# Patient Record
Sex: FEMALE | Race: ASIAN | Employment: FULL TIME | URBAN - METROPOLITAN AREA
[De-identification: names, ages, dates, MRNs, and addresses within clinical notes are randomized per-mention and may not be internally consistent; named-entity substitution may affect disease eponyms.]

---

## 2017-01-09 ENCOUNTER — HOSPITAL ENCOUNTER (OUTPATIENT)
Dept: GENERAL RADIOLOGY | Age: 52
Discharge: HOME | End: 2017-01-09
Attending: FAMILY MEDICINE
Payer: COMMERCIAL

## 2017-01-09 ENCOUNTER — TELEPHONE (OUTPATIENT)
Dept: FAMILY MEDICINE CLINIC | Age: 52
End: 2017-01-09

## 2017-01-09 ENCOUNTER — HOSPITAL ENCOUNTER (OUTPATIENT)
Dept: LAB | Age: 52
Discharge: HOME | End: 2017-01-09
Payer: COMMERCIAL

## 2017-01-09 ENCOUNTER — OFFICE VISIT (OUTPATIENT)
Dept: FAMILY MEDICINE CLINIC | Age: 52
End: 2017-01-09

## 2017-01-09 VITALS
RESPIRATION RATE: 20 BRPM | HEIGHT: 62 IN | OXYGEN SATURATION: 100 % | HEART RATE: 72 BPM | SYSTOLIC BLOOD PRESSURE: 115 MMHG | DIASTOLIC BLOOD PRESSURE: 68 MMHG | WEIGHT: 133.2 LBS | BODY MASS INDEX: 24.51 KG/M2 | TEMPERATURE: 97.8 F

## 2017-01-09 DIAGNOSIS — Z13.820 OSTEOPOROSIS SCREENING: ICD-10-CM

## 2017-01-09 DIAGNOSIS — R05.9 COUGH: ICD-10-CM

## 2017-01-09 DIAGNOSIS — Z12.39 SCREENING FOR MALIGNANT NEOPLASM OF BREAST: ICD-10-CM

## 2017-01-09 DIAGNOSIS — Z01.419 WELL WOMAN EXAM WITH ROUTINE GYNECOLOGICAL EXAM: Primary | ICD-10-CM

## 2017-01-09 DIAGNOSIS — E55.9 VITAMIN D DEFICIENCY: ICD-10-CM

## 2017-01-09 DIAGNOSIS — Z11.59 NEED FOR HEPATITIS C SCREENING TEST: ICD-10-CM

## 2017-01-09 DIAGNOSIS — Z12.4 SCREENING FOR MALIGNANT NEOPLASM OF CERVIX: ICD-10-CM

## 2017-01-09 DIAGNOSIS — Z12.11 SCREEN FOR COLON CANCER: ICD-10-CM

## 2017-01-09 PROCEDURE — 71020 XR CHEST PA LAT: CPT

## 2017-01-09 PROCEDURE — 88175 CYTOPATH C/V AUTO FLUID REDO: CPT | Performed by: FAMILY MEDICINE

## 2017-01-09 RX ORDER — LEVOFLOXACIN 500 MG/1
500 TABLET, FILM COATED ORAL DAILY
Qty: 10 TAB | Refills: 0 | Status: SHIPPED | OUTPATIENT
Start: 2017-01-09 | End: 2017-05-15 | Stop reason: ALTCHOICE

## 2017-01-09 RX ORDER — BISMUTH SUBSALICYLATE 262 MG
1 TABLET,CHEWABLE ORAL DAILY
COMMUNITY

## 2017-01-09 NOTE — PROGRESS NOTES
Subjective:   46 y.o. female for Well Woman Check. She is postmenopausal.  Social History: single partner, contraception - vasectomy. Pertinent past medical hstory: . Patient Active Problem List    Diagnosis Date Noted    Rheumatoid arthritis ([facility] [address] 12/07/2015    Carpal tunnel syndrome 12/07/2015    Gastroparesis 12/28/2011    GERD (gastroesophageal reflux disease) 06/30/2011     Current Outpatient Prescriptions   Medication Sig Dispense Refill    multivitamin (ONE A DAY) tablet Take 1 Tab by mouth daily.  levoFLOXacin (LEVAQUIN) 500 mg tablet Take 1 Tab by mouth daily. 10 Tab 0    folic acid (FOLVITE) 1 mg tablet Take 1 mg by mouth daily. 11    methotrexate (RHEUMATREX) 2.5 mg tablet Take 25 mg by mouth every Sunday. 5    GLUCOSAMINE/CHONDRO SALAZAR A (COSAMIN DS PO) Take  by mouth daily. daily, 0 Refills      polyethylene glycol (MIRALAX) 17 gram/dose powder  g, PO, daily, 0 Refills      cholecalciferol, vitamin D3, (VITAMIN D3) 2,000 unit tab 4,000 Units. takes sometimes, 0 Refills      B.infantis-B.ani-B.long-B.bifi (PROBIOTIC 4X) 10-15 mg TbEC Take  by mouth.  FISH  mg cap Take  by mouth.  calcium 500 mg tab Take  by mouth.        Allergies   Allergen Reactions    Biaxin [Clarithromycin] Shortness of Breath and Swelling    Ethanol (Ethyl Alcohol) Other (comments)     Cannot digest    Tormentil Other (comments)     redness     Past Medical History   Diagnosis Date    Bursitis/tendonitis, shoulder     Gastroparesis     GERD (gastroesophageal reflux disease)     Rheumatoid arthritis (HCC)     Rheumatoid arthritis (HCC)      Past Surgical History   Procedure Laterality Date    Hx other surgical  2008     right knuckle surgery to remove janet thorn     Family History   Problem Relation Age of Onset    Hypertension Mother     Hypertension Sister    Job [place] Elevated Lipids Sister      Social History   Substance Use Topics    Smoking status: Never Smoker    Smokeless tobacco: Never Used    Alcohol use No        ROS:  Not feeling well. chest pain from coughing. No abdominal pain, change in bowel habits, black or bloody stools. No urinary tract symptoms. GYN ROS: no breast pain or new or enlarging lumps on self exam, no vaginal bleeding, no discharge or pelvic pain. Menopausal symptoms: none. No neurological complaints. C/O pain in left side chest.  Cont to cough even after taking Amoxicillin. Productive thick phlegm. Pt no longer has headaches since quit taking caffeine. She did not get CT scan head. Last DEXA scan and T-score: none  Last Colonoscopy: 2013  Last Mammogram: Dec 2016    Objective:     Visit Vitals    LMP 02/10/2015     The patient appears well, alert, oriented x 3, in no distress. ENT normal.  Neck supple. No adenopathy or thyromegaly. SHIRLEY. Chest wall tenderness. Lungs are clear, good air entry, no wheezes, rhonchi or rales. S1 and S2 normal, no murmurs, regular rate and rhythm. Abdomen soft without tenderness, guarding, mass or organomegaly. Extremities show no edema, normal peripheral pulses. Neurological is normal, no focal findings. BREAST EXAM: breasts appear normal, no suspicious masses, no skin or nipple changes or axillary nodes    PELVIC EXAM: normal external genitalia, vulva, vagina, cervix, uterus and adnexa    Assessment/Plan:   well woman  postmenopausal  pap smear  return annually or prn  bone density screening ordered  screening colonoscopy referral written    ICD-10-CM ICD-9-CM    1. Well woman exam with routine gynecological exam Z01.419 V72.31 CBC WITH AUTOMATED DIFF      METABOLIC PANEL, COMPREHENSIVE      LIPID PANEL      TSH 3RD GENERATION      HEMOGLOBIN A1C WITH EAG    [V72.31]   2. Screening for malignant neoplasm of cervix Z12.4 V76.2 PAP, LIQUID BASED, IMAGE PROCESSED   3. Screening for malignant neoplasm of breast Z12.39 V76.10    4. Osteoporosis screening Z13.820 V82.81    5.  Screen for colon cancer Z12.11 V76.51 CANCELED: OCCULT BLOOD, IMMUNOASSAY (FIT)   6. Vitamin D deficiency E55.9 268.9 VITAMIN D, 25 HYDROXY   7. Need for hepatitis C screening test Z11.59 V73.89 HEPATITIS C AB   8. Cough R05 786.2 XR CHEST PA LAT      levoFLOXacin (LEVAQUIN) 500 mg tablet   . Order CXR. Order Levaquin x 10 days. Ibuprofen for muscular CP. Labs drawn. PAP done. Pt had colonoscopy done 2013 per Dr. Glory Quinonez. Request copy records. Patient Instructions        Cough: Care Instructions  Your Care Instructions  A cough is your body's response to something that bothers your throat or airways. Many things can cause a cough. You might cough because of a cold or the flu, bronchitis, or asthma. Smoking, postnasal drip, allergies, and stomach acid that backs up into your throat also can cause coughs. A cough is a symptom, not a disease. Most coughs stop when the cause, such as a cold, goes away. You can take a few steps at home to cough less and feel better. Follow-up care is a key part of your treatment and safety. Be sure to make and go to all appointments, and call your doctor if you are having problems. It's also a good idea to know your test results and keep a list of the medicines you take. How can you care for yourself at home? · Drink lots of water and other fluids. This helps thin the mucus and soothes a dry or sore throat. Honey or lemon juice in hot water or tea may ease a dry cough. · Take cough medicine as directed by your doctor. · Prop up your head on pillows to help you breathe and ease a dry cough. · Try cough drops to soothe a dry or sore throat. Cough drops don't stop a cough. Medicine-flavored cough drops are no better than candy-flavored drops or hard candy. · Do not smoke. Avoid secondhand smoke. If you need help quitting, talk to your doctor about stop-smoking programs and medicines. These can increase your chances of quitting for good. When should you call for help?   Call 911 anytime you think you may need emergency care. For example, call if:  · You have severe trouble breathing. Call your doctor now or seek immediate medical care if:  · You cough up blood. · You have new or worse trouble breathing. · You have a new or higher fever. · You have a new rash. Watch closely for changes in your health, and be sure to contact your doctor if:  · You cough more deeply or more often, especially if you notice more mucus or a change in the color of your mucus. · You have new symptoms, such as a sore throat, an earache, or sinus pain. · You do not get better as expected. Where can you learn more? Go to http://bell-thom.info/. Enter D279 in the search box to learn more about \"Cough: Care Instructions. \"  Current as of: May 27, 2016  Content Version: 11.1  © 9965-7585 [employer]. Care instructions adapted under license by [employer] (which disclaims liability or warranty for this information). If you have questions about a medical condition or this instruction, always ask your healthcare professional. [address] any warranty or liability for your use of this information.

## 2017-01-09 NOTE — PATIENT INSTRUCTIONS
Cough: Care Instructions  Your Care Instructions  A cough is your body's response to something that bothers your throat or airways. Many things can cause a cough. You might cough because of a cold or the flu, bronchitis, or asthma. Smoking, postnasal drip, allergies, and stomach acid that backs up into your throat also can cause coughs. A cough is a symptom, not a disease. Most coughs stop when the cause, such as a cold, goes away. You can take a few steps at home to cough less and feel better. Follow-up care is a key part of your treatment and safety. Be sure to make and go to all appointments, and call your doctor if you are having problems. It's also a good idea to know your test results and keep a list of the medicines you take. How can you care for yourself at home? · Drink lots of water and other fluids. This helps thin the mucus and soothes a dry or sore throat. Honey or lemon juice in hot water or tea may ease a dry cough. · Take cough medicine as directed by your doctor. · Prop up your head on pillows to help you breathe and ease a dry cough. · Try cough drops to soothe a dry or sore throat. Cough drops don't stop a cough. Medicine-flavored cough drops are no better than candy-flavored drops or hard candy. · Do not smoke. Avoid secondhand smoke. If you need help quitting, talk to your doctor about stop-smoking programs and medicines. These can increase your chances of quitting for good. When should you call for help? Call 911 anytime you think you may need emergency care. For example, call if:  · You have severe trouble breathing. Call your doctor now or seek immediate medical care if:  · You cough up blood. · You have new or worse trouble breathing. · You have a new or higher fever. · You have a new rash.   Watch closely for changes in your health, and be sure to contact your doctor if:  · You cough more deeply or more often, especially if you notice more mucus or a change in the color of your mucus. · You have new symptoms, such as a sore throat, an earache, or sinus pain. · You do not get better as expected. Where can you learn more? Go to http://bell-thom.info/. Enter D279 in the search box to learn more about \"Cough: Care Instructions. \"  Current as of: May 27, 2016  Content Version: 11.1  © 8286-6112 [employer]. Care instructions adapted under license by [employer] (which disclaims liability or warranty for this information). If you have questions about a medical condition or this instruction, always ask your healthcare professional. [address] any warranty or liability for your use of this information.

## 2017-01-09 NOTE — LETTER
1/12/2017 1:34 PM 
 
Ms[address] [id number] Dear Humphrey Campbell: 
 
Please find your most recent results below. RECOMMENDATIONS: 
Your PAP test has come back normal.  
 
Please call me if you have any questions: [phone] Sincerely, 
 
 
Dr Pablo Morton

## 2017-01-09 NOTE — LETTER
1/12/2017 1:33 PM 
 
Ms[address] [id number] Dear Naga Patel: 
 
Please find your most recent results below. Resulted Orders CBC WITH AUTOMATED DIFF Result Value Ref Range WBC 6.7 3.4 - 10.8 x10E3/uL  
 RBC 4.34 3.77 - 5.28 x10E6/uL HGB 11.9 11.1 - 15.9 g/dL HCT 37.1 34.0 - 46.6 % MCV 86 79 - 97 fL  
 MCH 27.4 26.6 - 33.0 pg  
 MCHC 32.1 31.5 - 35.7 g/dL  
 RDW 14.5 12.3 - 15.4 % PLATELET 343 173 - 665 x10E3/uL NEUTROPHILS 69 % Lymphocytes 22 % MONOCYTES 7 % EOSINOPHILS 1 % BASOPHILS 1 %  
 ABS. NEUTROPHILS 4.7 1.4 - 7.0 x10E3/uL Abs Lymphocytes 1.4 0.7 - 3.1 x10E3/uL  
 ABS. MONOCYTES 0.4 0.1 - 0.9 x10E3/uL  
 ABS. EOSINOPHILS 0.1 0.0 - 0.4 x10E3/uL  
 ABS. BASOPHILS 0.0 0.0 - 0.2 x10E3/uL IMMATURE GRANULOCYTES 0 %  
 ABS. IMM. GRANS. 0.0 0.0 - 0.1 x10E3/uL Narrative Performed at:  [address]  [id number] : Thiago Wheeler MD, Phone:  [phone] METABOLIC PANEL, COMPREHENSIVE Result Value Ref Range Glucose 87 65 - 99 mg/dL BUN 10 6 - 24 mg/dL Creatinine 0.62 0.57 - 1.00 mg/dL GFR est non- >59 mL/min/1.73 GFR est  >59 mL/min/1.73  
 BUN/Creatinine ratio 16 9 - 23 Sodium 141 134 - 144 mmol/L Potassium 3.9 3.5 - 5.2 mmol/L Chloride 100 96 - 106 mmol/L  
 CO2 27 18 - 29 mmol/L Calcium 9.8 8.7 - 10.2 mg/dL Protein, total 7.8 6.0 - 8.5 g/dL Albumin 5.0 3.5 - 5.5 g/dL GLOBULIN, TOTAL 2.8 1.5 - 4.5 g/dL A-G Ratio 1.8 1.1 - 2.5 Bilirubin, total 0.3 0.0 - 1.2 mg/dL Alk. phosphatase 73 39 - 117 IU/L  
 AST 30 0 - 40 IU/L  
 ALT 27 0 - 32 IU/L Narrative Performed at:  [address]  [id number] : Thiago Wheeler MD, Phone:  [phone] LIPID PANEL Result Value Ref Range Cholesterol, total 219 (H) 100 - 199 mg/dL Triglyceride 63 0 - 149 mg/dL HDL Cholesterol 83 >39 mg/dL VLDL, calculated 13 5 - 40 mg/dL LDL, calculated 123 (H) 0 - 99 mg/dL Narrative Performed at:  [address]  [id number] : Darletta Dancer MD, Phone:  [phone] TSH 3RD GENERATION Result Value Ref Range TSH 1.520 0.450 - 4.500 uIU/mL Narrative Performed at:  [address]  [id number] : Darletta Dancer MD, Phone:  [phone] VITAMIN D, 25 HYDROXY Result Value Ref Range VITAMIN D, 25-HYDROXY 56.7 30.0 - 100.0 ng/mL Comment:  
   Vitamin D deficiency has been defined by the [address] [place] [address] practice guideline as a 
level of serum 25-OH vitamin D less than 20 ng/mL (1,2). The Endocrine Society went on to further define vitamin D 
insufficiency as a level between 21 and 29 ng/mL (2). 1. IOM ([place] of [employer]). 2010. Dietary reference 
   intakes for calcium and D. [address] [place] [address]: The 
   [facility]. 2. Grey MF, Cuong NC, Davon RAYMOND, et al. 
   Evaluation, treatment, and prevention of vitamin D 
   deficiency: an Endocrine Society clinical practice 
   guideline. JCEM. 2011 Jul; 96(7):1911-30. Narrative Performed at:  [address]  [id number] : Darletta Dancer MD, Phone:  [phone] HEMOGLOBIN A1C WITH EAG Result Value Ref Range Hemoglobin A1c 5.8 (H) 4.8 - 5.6 % Comment:  
            Pre-diabetes: 5.7 - 6.4 Diabetes: >6.4 Glycemic control for adults with diabetes: <7.0 Estimated average glucose 120 mg/dL Narrative Performed at:  [address]  [id number] : Darletta Dancer MD, Phone:  [phone] HEPATITIS C AB Result Value Ref Range Hep C Virus Ab <0.1 0.0 - 0.9 s/co ratio Comment:  
                                     Negative:     < 0.8 Indeterminate: 0.8 - 0.9 Positive:     > 0.9 The CDC recommends that a positive HCV antibody result 
 be followed up with a HCV Nucleic Acid Amplification 
 test ([id number]). Narrative Performed at:  [address]  [id number] : Thiago Wheeler MD, Phone:  [phone] CVD REPORT Result Value Ref Range INTERPRETATION Note Comment:  
   Supplement report is available. Narrative Performed at:  [address] [place] [address] [address] [place] [address]  [id number] : Karel Sams PhD, Phone:  [phone] RECOMMENDATIONS: 
Lab results look good.  Mild elevated cholesterol numbers.  Need to watch diet.  Normal kidney and liver tests. Normal thyroid.  Good Vit D level.  You are at risk for diabetes.  Minimize sugar and starches in diet.  
    
   
 
 
 
Please call me if you have any questions: [phone] Sincerely, Matias Cruz MD

## 2017-01-09 NOTE — PROGRESS NOTES
Identified pt with two pt identifiers(name and ). Chief Complaint   Patient presents with    Physical     pap    Cough     dry cough and her chest hurts when she coughs    Headache     no headaches since no caffene        Health Maintenance Due   Topic    COLONOSCOPY        Wt Readings from Last 3 Encounters:   17 133 lb 3.2 oz (60.4 kg)   16 132 lb (59.9 kg)   16 128 lb 3.2 oz (58.2 kg)     Temp Readings from Last 3 Encounters:   17 97.8 °F (36.6 °C) (Oral)   16 98.2 °F (36.8 °C) (Oral)   16 97.8 °F (36.6 °C) (Oral)     BP Readings from Last 3 Encounters:   17 115/68   16 122/70   16 105/67     Pulse Readings from Last 3 Encounters:   17 72   16 82   16 79         Learning Assessment:  :     Learning Assessment 2/3/2014   PRIMARY LEARNER Patient   HIGHEST LEVEL OF EDUCATION - PRIMARY LEARNER  DID NOT GRADUATE HIGH SCHOOL   BARRIERS PRIMARY LEARNER NONE   CO-LEARNER CAREGIVER No   PRIMARY LANGUAGE OTHER (COMMENT)   LEARNER PREFERENCE PRIMARY LISTENING     DEMONSTRATION   ANSWERED BY SELF   RELATIONSHIP SELF       Depression Screening:  :     PHQ 2 / 9, over the last two weeks 2016   Little interest or pleasure in doing things Not at all   Feeling down, depressed or hopeless Not at all   Total Score PHQ 2 0       Fall Risk Assessment:  :     Fall Risk Assessment, last 12 mths 2017   Able to walk? Yes   Fall in past 12 months? No       Abuse Screening:  :     Abuse Screening Questionnaire 2017 3/16/2015   Do you ever feel afraid of your partner? N N   Are you in a relationship with someone who physically or mentally threatens you? N N   Is it safe for you to go home?  Y Y       Coordination of Care Questionnaire:  :     1) Have you been to an emergency room, urgent care clinic since your last visit? no   Hospitalized since your last visit? no             2) Have you seen or consulted any other health care providers outside of [facility] since your last visit? yes Dr Edilma Middleton. (Include any pap smears or colon screenings in this section.)    3) Do you have an Advance Directive on file? no  Are you interested in receiving information about Advance Directives? no    Reviewed record in preparation for visit and have obtained necessary documentation. Medication reconciliation up to date and corrected with patient at this time.

## 2017-01-09 NOTE — MR AVS SNAPSHOT
Visit Information Date & Time Provider Department Dept. Phone Encounter #  
 5/5/5190  3:67 AM Damir Donohue Santhosh [phone] [id number] Upcoming Health Maintenance Date Due COLONOSCOPY 7/25/1983 BREAST CANCER SCRN MAMMOGRAM 12/28/2018 PAP AKA CERVICAL CYTOLOGY 1/9/2020 DTaP/Tdap/Td series (3 - Td) 1/9/2027 Allergies as of 1/9/2017  Review Complete On: 9/4/8500 By: Shen Ball MD  
  
 Severity Noted Reaction Type Reactions Biaxin [Clarithromycin] High 06/29/2011    Shortness of Breath, Swelling Ethanol (Ethyl Alcohol)  06/29/2011    Other (comments) Cannot digest  
 Tormentil  06/29/2011    Other (comments)  
 redness Current Immunizations  Reviewed on 12/7/2015 Name Date Tdap 4/26/2005 Not reviewed this visit You Were Diagnosed With   
  
 Codes Comments Well woman exam with routine gynecological exam    -  Primary ICD-10-CM: K41.992 ICD-9-CM: V72.31 [V72.31] Screening for malignant neoplasm of cervix     ICD-10-CM: Z12.4 ICD-9-CM: V76.2 Screening for malignant neoplasm of breast     ICD-10-CM: Z12.39 
ICD-9-CM: V76.10 Osteoporosis screening     ICD-10-CM: Z13.820 ICD-9-CM: V82.81 Screen for colon cancer     ICD-10-CM: Z12.11 ICD-9-CM: V76.51 Vitamin D deficiency     ICD-10-CM: E55.9 ICD-9-CM: 268.9 Need for hepatitis C screening test     ICD-10-CM: Z11.59 
ICD-9-CM: V73.89 Cough     ICD-10-CM: R05 ICD-9-CM: 677. 2 Vitals BP Pulse Temp Resp Height(growth percentile) Weight(growth percentile)  
 115/68 (BP 1 Location: Left arm, BP Patient Position: Sitting) 72 97.8 °F (36.6 °C) (Oral) 20 5' 2\" (1.575 m) 133 lb 3.2 oz (60.4 kg) LMP SpO2 BMI OB Status Smoking Status 02/10/2015 100% 24.36 kg/m2 Postmenopausal Never Smoker BMI and BSA Data Body Mass Index Body Surface Area  
 24.36 kg/m 2 1.63 m 2 Preferred Pharmacy Pharmacy Name Phone [id number] Otledyia Apo, [address] [place] [address] [phone] Your Updated Medication List  
  
   
This list is accurate as of: 1/9/17 10:33 AM.  Always use your most recent med list.  
  
  
  
  
 calcium 500 mg Tab Take  by mouth. COSAMIN DS PO Take  by mouth daily. daily, 0 Refills FISH  mg Cap Generic drug:  Omega-3 Fatty Acids Take  by mouth. folic acid 1 mg tablet Commonly known as:  Google Take 1 mg by mouth daily. levoFLOXacin 500 mg tablet Commonly known as:  Lizandro Jad Take 1 Tab by mouth daily. methotrexate 2.5 mg tablet Commonly known as:  Rivera Mathewme Take 25 mg by mouth every Sunday. MIRALAX 17 gram/dose powder Generic drug:  polyethylene glycol  
g, PO, daily, 0 Refills  
  
 multivitamin tablet Commonly known as:  ONE A DAY Take 1 Tab by mouth daily. PROBIOTIC 4X 10-15 mg Tbec Generic drug:  B.infantis-B.ani-B.long-B.bifi Take  by mouth. VITAMIN D3 2,000 unit Tab Generic drug:  cholecalciferol (vitamin D3)  
4,000 Units. takes sometimes, 0 Refills Prescriptions Sent to Pharmacy Refills  
 levoFLOXacin (LEVAQUIN) 500 mg tablet 0 Sig: Take 1 Tab by mouth daily. Class: Normal  
 Pharmacy: [facility] [address], [address] [place] [address] Ph #: [phone] Route: Oral  
  
We Performed the Following CBC WITH AUTOMATED DIFF [06193 CPT(R)] HEMOGLOBIN A1C WITH EAG [88797 CPT(R)] HEPATITIS C AB [37701 CPT(R)] LIPID PANEL [55019 CPT(R)] METABOLIC PANEL, COMPREHENSIVE [52266 CPT(R)] PAP, LIQUID BASED, IMAGE PROCESSED [id number] CPT(R)] TSH 3RD GENERATION [26272 CPT(R)] VITAMIN D, 25 HYDROXY [id number] CPT(R)] To-Do List   
 01/09/2017 Imaging:  XR CHEST PA LAT Patient Instructions Cough: Care Instructions Your Care Instructions A cough is your body's response to something that bothers your throat or airways. Many things can cause a cough. You might cough because of a cold or the flu, bronchitis, or asthma. Smoking, postnasal drip, allergies, and stomach acid that backs up into your throat also can cause coughs. A cough is a symptom, not a disease. Most coughs stop when the cause, such as a cold, goes away. You can take a few steps at home to cough less and feel better. Follow-up care is a key part of your treatment and safety. Be sure to make and go to all appointments, and call your doctor if you are having problems. It's also a good idea to know your test results and keep a list of the medicines you take. How can you care for yourself at home? · Drink lots of water and other fluids. This helps thin the mucus and soothes a dry or sore throat. Honey or lemon juice in hot water or tea may ease a dry cough. · Take cough medicine as directed by your doctor. · Prop up your head on pillows to help you breathe and ease a dry cough. · Try cough drops to soothe a dry or sore throat. Cough drops don't stop a cough. Medicine-flavored cough drops are no better than candy-flavored drops or hard candy. · Do not smoke. Avoid secondhand smoke. If you need help quitting, talk to your doctor about stop-smoking programs and medicines. These can increase your chances of quitting for good. When should you call for help? Call 911 anytime you think you may need emergency care. For example, call if: 
· You have severe trouble breathing. Call your doctor now or seek immediate medical care if: 
· You cough up blood. · You have new or worse trouble breathing. · You have a new or higher fever. · You have a new rash. Watch closely for changes in your health, and be sure to contact your doctor if: 
· You cough more deeply or more often, especially if you notice more mucus or a change in the color of your mucus. · You have new symptoms, such as a sore throat, an earache, or sinus pain. · You do not get better as expected. Where can you learn more? Go to http://bell-thom.info/. Enter D279 in the search box to learn more about \"Cough: Care Instructions. \" Current as of: May 27, 2016 Content Version: 11.1 © 9189-5595 [employer]King. Care instructions adapted under license by [employer] (which disclaims liability or warranty for this information). If you have questions about a medical condition or this instruction, always ask your healthcare professional. [place] [address] any warranty or liability for your use of this information. Introducing [facility] & HEALTH SERVICES! Wander Maurer introduces [facility] patient portal. Now you can access parts of your medical record, email your doctor's office, and request medication refills online. 1. In your internet browser, go to [email or web address] 2. Click on the First Time User? Click Here link in the Sign In box. You will see the New Member Sign Up page. 3. Enter your [facility] Access Code exactly as it appears below. You will not need to use this code after youve completed the sign-up process. If you do not sign up before the expiration date, you must request a new code. · [facility] Access Code: [email or web address] Expires: 3/9/2017  4:21 PM 
 
4. Enter the last four digits of your Social Security Number (xxxx) and Date of Birth (mm/dd/yyyy) as indicated and click Submit. You will be taken to the next sign-up page. 5. Create a Zannelt ID. This will be your [facility] login ID and cannot be changed, so think of one that is secure and easy to remember. 6. Create a [facility] password. You can change your password at any time. 7. Enter your Password Reset Question and Answer. This can be used at a later time if you forget your password. 8. Enter your e-mail address. You will receive e-mail notification when new information is available in [address] 9. Click Sign Up. You can now view and download portions of your medical record. 10. Click the Download Summary menu link to download a portable copy of your medical information. If you have questions, please visit the Frequently Asked Questions section of the [employer] website. Remember, [employer] is NOT to be used for urgent needs. For medical emergencies, dial 911. Now available from your iPhone and Android! Please provide this summary of care documentation to your next provider. Your primary care clinician is listed as Megan Benavidez. If you have any questions after today's visit, please call [phone].

## 2017-01-10 ENCOUNTER — TELEPHONE (OUTPATIENT)
Dept: FAMILY MEDICINE CLINIC | Age: 52
End: 2017-01-10

## 2017-01-10 LAB
25(OH)D3+25(OH)D2 SERPL-MCNC: 56.7 NG/ML (ref 30–100)
ALBUMIN SERPL-MCNC: 5 G/DL (ref 3.5–5.5)
ALBUMIN/GLOB SERPL: 1.8 {RATIO} (ref 1.1–2.5)
ALP SERPL-CCNC: 73 IU/L (ref 39–117)
ALT SERPL-CCNC: 27 IU/L (ref 0–32)
AST SERPL-CCNC: 30 IU/L (ref 0–40)
BASOPHILS # BLD AUTO: 0 X10E3/UL (ref 0–0.2)
BASOPHILS NFR BLD AUTO: 1 %
BILIRUB SERPL-MCNC: 0.3 MG/DL (ref 0–1.2)
BUN SERPL-MCNC: 10 MG/DL (ref 6–24)
BUN/CREAT SERPL: 16 (ref 9–23)
CALCIUM SERPL-MCNC: 9.8 MG/DL (ref 8.7–10.2)
CHLORIDE SERPL-SCNC: 100 MMOL/L (ref 96–106)
CHOLEST SERPL-MCNC: 219 MG/DL (ref 100–199)
CO2 SERPL-SCNC: 27 MMOL/L (ref 18–29)
CREAT SERPL-MCNC: 0.62 MG/DL (ref 0.57–1)
EOSINOPHIL # BLD AUTO: 0.1 X10E3/UL (ref 0–0.4)
EOSINOPHIL NFR BLD AUTO: 1 %
ERYTHROCYTE [DISTWIDTH] IN BLOOD BY AUTOMATED COUNT: 14.5 % (ref 12.3–15.4)
EST. AVERAGE GLUCOSE BLD GHB EST-MCNC: 120 MG/DL
GLOBULIN SER CALC-MCNC: 2.8 G/DL (ref 1.5–4.5)
GLUCOSE SERPL-MCNC: 87 MG/DL (ref 65–99)
HBA1C MFR BLD: 5.8 % (ref 4.8–5.6)
HCT VFR BLD AUTO: 37.1 % (ref 34–46.6)
HCV AB S/CO SERPL IA: <0.1 S/CO RATIO (ref 0–0.9)
HDLC SERPL-MCNC: 83 MG/DL
HGB BLD-MCNC: 11.9 G/DL (ref 11.1–15.9)
IMM GRANULOCYTES # BLD: 0 X10E3/UL (ref 0–0.1)
IMM GRANULOCYTES NFR BLD: 0 %
INTERPRETATION, 910389: NORMAL
LDLC SERPL CALC-MCNC: 123 MG/DL (ref 0–99)
LYMPHOCYTES # BLD AUTO: 1.4 X10E3/UL (ref 0.7–3.1)
LYMPHOCYTES NFR BLD AUTO: 22 %
MCH RBC QN AUTO: 27.4 PG (ref 26.6–33)
MCHC RBC AUTO-ENTMCNC: 32.1 G/DL (ref 31.5–35.7)
MCV RBC AUTO: 86 FL (ref 79–97)
MONOCYTES # BLD AUTO: 0.4 X10E3/UL (ref 0.1–0.9)
MONOCYTES NFR BLD AUTO: 7 %
NEUTROPHILS # BLD AUTO: 4.7 X10E3/UL (ref 1.4–7)
NEUTROPHILS NFR BLD AUTO: 69 %
PLATELET # BLD AUTO: 289 X10E3/UL (ref 150–379)
POTASSIUM SERPL-SCNC: 3.9 MMOL/L (ref 3.5–5.2)
PROT SERPL-MCNC: 7.8 G/DL (ref 6–8.5)
RBC # BLD AUTO: 4.34 X10E6/UL (ref 3.77–5.28)
SODIUM SERPL-SCNC: 141 MMOL/L (ref 134–144)
TRIGL SERPL-MCNC: 63 MG/DL (ref 0–149)
TSH SERPL DL<=0.005 MIU/L-ACNC: 1.52 UIU/ML (ref 0.45–4.5)
VLDLC SERPL CALC-MCNC: 13 MG/DL (ref 5–40)
WBC # BLD AUTO: 6.7 X10E3/UL (ref 3.4–10.8)

## 2017-01-11 NOTE — PROGRESS NOTES
Lab results look good. Mild elevated cholesterol numbers. Need to watch diet. Normal kidney and liver tests. Normal thyroid. Good Vit D level. You are at risk for diabetes. Minimize sugar and starches in diet.

## 2017-01-17 ENCOUNTER — HOSPITAL ENCOUNTER (OUTPATIENT)
Dept: BONE DENSITY | Age: 52
Discharge: HOME | End: 2017-01-17
Attending: FAMILY MEDICINE
Payer: COMMERCIAL

## 2017-01-17 DIAGNOSIS — Z13.820 OSTEOPOROSIS SCREENING: ICD-10-CM

## 2017-01-17 PROCEDURE — 77080 DXA BONE DENSITY AXIAL: CPT

## 2017-01-20 ENCOUNTER — TELEPHONE (OUTPATIENT)
Dept: FAMILY MEDICINE CLINIC | Age: 52
End: 2017-01-20

## 2017-01-20 NOTE — TELEPHONE ENCOUNTER
Laurie Adame () called to get lab results as well as bone density scan results. I advised Roselyn Valenzuela of what was on letter that Dr Venkat Maria sent out on 1/12/17. Roselyn Valenzuela stated that they have not received letter. Roselyn Valenzuela is requesting a call back to discuss bone density results as well as any other questions.     Best contact: [phone]

## 2017-01-22 NOTE — PROGRESS NOTES
Osteoporosis. Will need discuss treatment. Fax copy to her Rheumatologist at [facility] Dr. Tñoa Awad.

## 2017-01-23 NOTE — TELEPHONE ENCOUNTER
Left detailed message on Mika's voice mail. Resent results letter with lab results. Also DEXA shows osteoporosis. We will fax report to her Rheumatologist @ [facility] Dr. Damian Valero. She will need treatment. Call if any more questions.

## 2017-01-25 ENCOUNTER — TELEPHONE (OUTPATIENT)
Dept: FAMILY MEDICINE CLINIC | Age: 52
End: 2017-01-25

## 2017-01-25 NOTE — TELEPHONE ENCOUNTER
I spoke with Mika regarding his wife's DEXA. They have FU appt with Rheum Dr. Nury Avitia at [facility] in March. Please make sure to fax the DEXA report to her office. Also mail a copy of DEXA report to pt's home.

## 2017-01-25 NOTE — TELEPHONE ENCOUNTER
Pt's  needs a call back about the bone density report - severity level - medicine or what please call

## 2017-05-15 ENCOUNTER — OFFICE VISIT (OUTPATIENT)
Dept: FAMILY MEDICINE CLINIC | Age: 52
End: 2017-05-15

## 2017-05-15 VITALS
OXYGEN SATURATION: 97 % | RESPIRATION RATE: 18 BRPM | TEMPERATURE: 98 F | BODY MASS INDEX: 23.74 KG/M2 | HEART RATE: 78 BPM | HEIGHT: 62 IN | DIASTOLIC BLOOD PRESSURE: 70 MMHG | WEIGHT: 129 LBS | SYSTOLIC BLOOD PRESSURE: 115 MMHG

## 2017-05-15 DIAGNOSIS — M79.89 RIGHT LEG SWELLING: Primary | ICD-10-CM

## 2017-05-15 DIAGNOSIS — Z91.09 ENVIRONMENTAL ALLERGIES: ICD-10-CM

## 2017-05-15 RX ORDER — LEVOCETIRIZINE DIHYDROCHLORIDE 5 MG/1
5 TABLET, FILM COATED ORAL DAILY
Qty: 30 TAB | Refills: 3 | Status: SHIPPED | OUTPATIENT
Start: 2017-05-15 | End: 2017-10-20 | Stop reason: ALTCHOICE

## 2017-05-15 NOTE — MR AVS SNAPSHOT
Visit Information Date & Time Provider Department Dept. Phone Encounter #  
 9/72/6547  4:75 PM Samanta Parker 108 [phone] [id number] Upcoming Health Maintenance Date Due INFLUENZA AGE 9 TO ADULT 8/1/2017 BREAST CANCER SCRN MAMMOGRAM 12/28/2018 PAP AKA CERVICAL CYTOLOGY 1/9/2020 COLONOSCOPY 4/4/2023 DTaP/Tdap/Td series (3 - Td) 1/9/2027 Allergies as of 5/15/2017  Review Complete On: 6/23/9135 By: Deyanira Luo MD  
  
 Severity Noted Reaction Type Reactions Biaxin [Clarithromycin] High 06/29/2011    Shortness of Breath, Swelling Ethanol (Ethyl Alcohol)  06/29/2011    Other (comments) Cannot digest  
 Tormentil  06/29/2011    Other (comments)  
 redness Current Immunizations  Reviewed on 12/7/2015 Name Date Tdap 4/26/2005 Not reviewed this visit You Were Diagnosed With   
  
 Codes Comments Right leg swelling    -  Primary ICD-10-CM: M79.89 ICD-9-CM: 729.81 Environmental allergies     ICD-10-CM: Z91.09 
ICD-9-CM: V15.09 Vitals BP Pulse Temp Resp Height(growth percentile) Weight(growth percentile) 115/70 (BP 1 Location: Right arm, BP Patient Position: Sitting) 78 98 °F (36.7 °C) (Oral) 18 5' 2\" (1.575 m) 129 lb (58.5 kg) LMP SpO2 BMI OB Status Smoking Status 02/10/2015 97% 23.59 kg/m2 Postmenopausal Never Smoker Vitals History BMI and BSA Data Body Mass Index Body Surface Area  
 23.59 kg/m 2 1.6 m 2 Preferred Pharmacy Pharmacy Name Phone [id number] Romie Alvarenga, [address] [facility] [phone] Your Updated Medication List  
  
   
This list is accurate as of: 5/15/17  5:22 PM.  Always use your most recent med list.  
  
  
  
  
 calcium 500 mg Tab Take  by mouth. COSAMIN DS PO Take  by mouth daily. daily, 0 Refills FISH  mg Cap Generic drug:  Omega-3 Fatty Acids Take  by mouth. folic acid 1 mg tablet Commonly known as:  Quita Take 1 mg by mouth daily. levocetirizine 5 mg tablet Commonly known as:  Dhruv Richey Take 1 Tab by mouth daily. Indications: ALLERGIC RHINITIS  
  
 methotrexate 2.5 mg tablet Commonly known as:  Karihsma Kimberly Take 25 mg by mouth every Sunday. MIRALAX 17 gram/dose powder Generic drug:  polyethylene glycol  
g, PO, daily, 0 Refills  
  
 multivitamin tablet Commonly known as:  ONE A DAY Take 1 Tab by mouth daily. PROBIOTIC 4X 10-15 mg Tbec Generic drug:  B.infantis-B.ani-B.long-B.bifi Take  by mouth. VITAMIN D3 2,000 unit Tab Generic drug:  cholecalciferol (vitamin D3)  
4,000 Units. takes sometimes, 0 Refills Prescriptions Sent to Pharmacy Refills  
 levocetirizine (XYZAL) 5 mg tablet 3 Sig: Take 1 Tab by mouth daily. Indications: ALLERGIC RHINITIS Class: Normal  
 Pharmacy: [facility]/pharmacy [address] [place] [address], [address] [place] [address] #: [phone] Route: Oral  
  
To-Do List   
 05/15/2017 Imaging:  DUPLEX LOWER EXT VENOUS RIGHT Patient Instructions Seasonal Allergies: Care Instructions Your Care Instructions Allergies occur when your body's defense system (immune system) overreacts to certain substances. The immune system treats a harmless substance as if it were a harmful germ or virus. Many things can cause this to happen. Examples include pollens, medicine, food, dust, animal dander, and mold. Your allergies are seasonal if you have symptoms just at certain times of the year. In that case, you are probably allergic to pollens from certain trees, grasses, or weeds. Allergies can be mild or severe. Over-the-counter allergy medicine may help with some symptoms. Read and follow all instructions on the label. Managing your allergies is an important part of staying healthy.  Your doctor may suggest that you have tests to help find the cause of your allergies. When you know what things trigger your symptoms, you can avoid them. This can prevent allergy symptoms and other health problems. In some cases, immunotherapy might help. For this treatment, you get shots or use pills that have a small amount of certain allergens in them. Your body \"gets used to\" the allergen, so you react less to it over time. This kind of treatment may help prevent or reduce some allergy symptoms. Follow-up care is a key part of your treatment and safety. Be sure to make and go to all appointments, and call your doctor if you are having problems. It's also a good idea to know your test results and keep a list of the medicines you take. How can you care for yourself at home? · Be safe with medicines. Take your medicines exactly as prescribed. Call your doctor if you think you are having a problem with your medicine. · During your allergy season, keep windows closed. If you need to use air-conditioning, change or clean all filters every month. Take a shower and change your clothes after you have been outside. · Stay inside when pollen counts are high. Vacuum once or twice a week. Use a vacuum  with a HEPA filter or a double-thickness filter. When should you call for help? Call 911 anytime you think you may need emergency care. For example, call if: 
· You have symptoms of a severe allergic reaction. These may include: 
¨ Sudden raised, red areas (hives) all over your body. ¨ Swelling of the throat, mouth, lips, or tongue. ¨ Trouble breathing. ¨ Passing out (losing consciousness). Or you may feel very lightheaded or suddenly feel weak, confused, or restless. Watch closely for changes in your health, and be sure to contact your doctor if: 
· You need help controlling your allergies. · You have questions about allergy testing. · You do not get better as expected. Where can you learn more? Go to http://bell-thom.info/. Enter J912 in the search box to learn more about \"Seasonal Allergies: Care Instructions. \" Current as of: February 12, 2016 Content Version: 11.2 © 5281-0837 [employer]pex Pharmaceuticals. Care instructions adapted under license by [employer]Cell (which disclaims liability or warranty for this information). If you have questions about a medical condition or this instruction, always ask your healthcare professional. [facility] any warranty or liability for your use of this information. Introducing [facility] & HEALTH SERVICES! [employer] introduces [employer] patient portal. Now you can access parts of your medical record, email your doctor's office, and request medication refills online. 1. In your internet browser, go to [email or web address] [employer]vertising 2. Click on the First Time User? Click Here link in the Sign In box. You will see the New Member Sign Up page. 3. Enter your [employer] Access Code exactly as it appears below. You will not need to use this code after youve completed the sign-up process. If you do not sign up before the expiration date, you must request a new code. · [email or web address] Access Code: [email or web address] Expires: 8/13/2017  4:30 PM 
 
4. Enter the last four digits of your Social Security Number (xxxx) and Date of Birth (mm/dd/yyyy) as indicated and click Submit. You will be taken to the next sign-up page. 5. Create a [email or web address] ID. This will be your [employer] login ID and cannot be changed, so think of one that is secure and easy to remember. 6. Create a [email or web address] password. You can change your password at any time. 7. Enter your Password Reset Question and Answer. This can be used at a later time if you forget your password. 8. Enter your e-mail address. You will receive e-mail notification when new information is available in [address] 9. Click Sign Up.  You can now view and download portions of your medical record. 10. Click the Download Summary menu link to download a portable copy of your medical information. If you have questions, please visit the Frequently Asked Questions section of the [employer] website. Remember, [employer] is NOT to be used for urgent needs. For medical emergencies, dial 911. Now available from your iPhone and Android! Please provide this summary of care documentation to your next provider. Your primary care clinician is listed as Varghese Menard. If you have any questions after today's visit, please call [phone].

## 2017-05-15 NOTE — PROGRESS NOTES
Identified pt with two pt identifiers(name and ). Chief Complaint   Patient presents with    Cough     mucus is clear bubbly sometimes pink tinged.  Sore Throat     swelled pain.  Leg Pain     Right leg. Inner thigh is changing color. lower leg is swelling. pitting edema. There are no preventive care reminders to display for this patient. Wt Readings from Last 3 Encounters:   05/15/17 129 lb (58.5 kg)   17 133 lb 3.2 oz (60.4 kg)   16 132 lb (59.9 kg)     Temp Readings from Last 3 Encounters:   05/15/17 98 °F (36.7 °C) (Oral)   17 97.8 °F (36.6 °C) (Oral)   16 98.2 °F (36.8 °C) (Oral)     BP Readings from Last 3 Encounters:   05/15/17 115/70   17 115/68   16 122/70     Pulse Readings from Last 3 Encounters:   05/15/17 78   17 72   16 82         Learning Assessment:  :     Learning Assessment 2/3/2014   PRIMARY LEARNER Patient   HIGHEST LEVEL OF EDUCATION - PRIMARY LEARNER  DID NOT GRADUATE HIGH SCHOOL   BARRIERS PRIMARY LEARNER NONE   CO-LEARNER CAREGIVER No   PRIMARY LANGUAGE OTHER (COMMENT)   LEARNER PREFERENCE PRIMARY LISTENING     DEMONSTRATION   ANSWERED BY SELF   RELATIONSHIP SELF       Depression Screening:  :     PHQ over the last two weeks 2016   Little interest or pleasure in doing things Not at all   Feeling down, depressed or hopeless Not at all   Total Score PHQ 2 0       Fall Risk Assessment:  :     Fall Risk Assessment, last 12 mths 2017   Able to walk? Yes   Fall in past 12 months? No       Abuse Screening:  :     Abuse Screening Questionnaire 2017 3/16/2015   Do you ever feel afraid of your partner? N N   Are you in a relationship with someone who physically or mentally threatens you? N N   Is it safe for you to go home?  Y Y       Coordination of Care Questionnaire:  :     1) Have you been to an emergency room, urgent care clinic since your last visit? no   Hospitalized since your last visit? no             2) Have you seen or consulted any other health care providers outside of [address] [place] [address] since your last visit? no  (Include any pap smears or colon screenings in this section.)    3) Do you have an Advance Directive on file? no  Are you interested in receiving information about Advance Directives? no    Reviewed record in preparation for visit and have obtained necessary documentation. Medication reconciliation up to date and corrected with patient at this time.

## 2017-05-15 NOTE — PATIENT INSTRUCTIONS
Seasonal Allergies: Care Instructions  Your Care Instructions  Allergies occur when your body's defense system (immune system) overreacts to certain substances. The immune system treats a harmless substance as if it were a harmful germ or virus. Many things can cause this to happen. Examples include pollens, medicine, food, dust, animal dander, and mold. Your allergies are seasonal if you have symptoms just at certain times of the year. In that case, you are probably allergic to pollens from certain trees, grasses, or weeds. Allergies can be mild or severe. Over-the-counter allergy medicine may help with some symptoms. Read and follow all instructions on the label. Managing your allergies is an important part of staying healthy. Your doctor may suggest that you have tests to help find the cause of your allergies. When you know what things trigger your symptoms, you can avoid them. This can prevent allergy symptoms and other health problems. In some cases, immunotherapy might help. For this treatment, you get shots or use pills that have a small amount of certain allergens in them. Your body \"gets used to\" the allergen, so you react less to it over time. This kind of treatment may help prevent or reduce some allergy symptoms. Follow-up care is a key part of your treatment and safety. Be sure to make and go to all appointments, and call your doctor if you are having problems. It's also a good idea to know your test results and keep a list of the medicines you take. How can you care for yourself at home? · Be safe with medicines. Take your medicines exactly as prescribed. Call your doctor if you think you are having a problem with your medicine. · During your allergy season, keep windows closed. If you need to use air-conditioning, change or clean all filters every month. Take a shower and change your clothes after you have been outside. · Stay inside when pollen counts are high.  Vacuum once or twice a week. Use a vacuum  with a HEPA filter or a double-thickness filter. When should you call for help? Call 911 anytime you think you may need emergency care. For example, call if:  · You have symptoms of a severe allergic reaction. These may include:  ¨ Sudden raised, red areas (hives) all over your body. ¨ Swelling of the throat, mouth, lips, or tongue. ¨ Trouble breathing. ¨ Passing out (losing consciousness). Or you may feel very lightheaded or suddenly feel weak, confused, or restless. Watch closely for changes in your health, and be sure to contact your doctor if:  · You need help controlling your allergies. · You have questions about allergy testing. · You do not get better as expected. Where can you learn more? Go to http://bell-thom.info/. Enter J912 in the search box to learn more about \"Seasonal Allergies: Care Instructions. \"  Current as of: February 12, 2016  Content Version: 11.2  © 5691-6755 [employer]agg[employer]. Care instructions adapted under license by [employer] (which disclaims liability or warranty for this information). If you have questions about a medical condition or this instruction, always ask your healthcare professional. [address] any warranty or liability for your use of this information.

## 2017-05-16 NOTE — PROGRESS NOTES
HISTORY OF PRESENT ILLNESS  Yessenia Gutiérrez is a 46 y.o. female. HPI  C/O right leg pain last Wed. Skin was red and very tender to touch along upper medial thigh. There was a palpable cord last week. Now more bruised skin color. denies hx of trauma. Today notes swelling of lower right leg. Also last month C/O a lot of nasal congestion, coughing and production clear bubbly mucus. Denies any CP or SOB. Review of Systems   HENT: Positive for congestion. Respiratory: Positive for cough. Negative for shortness of breath and wheezing. Cardiovascular: Positive for leg swelling. Negative for chest pain. All other systems reviewed and are negative. Past Medical History:   Diagnosis Date    Bursitis/tendonitis, shoulder     Gastroparesis     GERD (gastroesophageal reflux disease)     Rheumatoid arthritis ([address]     Rheumatoid arthritis ([address]      Past Surgical History:   Procedure Laterality Date    HX OTHER SURGICAL  2008    right knuckle surgery to remove janet thorn     Current Outpatient Prescriptions   Medication Sig Dispense Refill    levocetirizine (XYZAL) 5 mg tablet Take 1 Tab by mouth daily. Indications: ALLERGIC RHINITIS 30 Tab 3    multivitamin (ONE A DAY) tablet Take 1 Tab by mouth daily.  folic acid (FOLVITE) 1 mg tablet Take 1 mg by mouth daily. 11    methotrexate (RHEUMATREX) 2.5 mg tablet Take 25 mg by mouth every Sunday. 5    GLUCOSAMINE/CHONDRO SALAZAR A (COSAMIN DS PO) Take  by mouth daily. daily, 0 Refills      polyethylene glycol (MIRALAX) 17 gram/dose powder  g, PO, daily, 0 Refills      cholecalciferol, vitamin D3, (VITAMIN D3) 2,000 unit tab 4,000 Units. takes sometimes, 0 Refills      B.infantis-B.ani-B.long-B.bifi (PROBIOTIC 4X) 10-15 mg TbEC Take  by mouth.  FISH  mg cap Take  by mouth.  calcium 500 mg tab Take  by mouth.        Social History     Social History    Marital status:      Spouse name: N/A    Number of children: N/A    Years of education: N/A     Occupational History    Not on file. Social History Main Topics    Smoking status: Never Smoker    Smokeless tobacco: Never Used    Alcohol use No    Drug use: No    Sexual activity: Yes     Partners: Male     Other Topics Concern    Not on file     Social History Narrative     Family History   Problem Relation Age of Onset    Hypertension Mother     Hypertension Sister     Elevated Lipids Sister        Visit Vitals    /70 (BP 1 Location: Right arm, BP Patient Position: Sitting)    Pulse 78    Temp 98 °F (36.7 °C) (Oral)    Resp 18    Ht 5' 2\" (1.575 m)    Wt 129 lb (58.5 kg)    LMP 02/10/2015    SpO2 97%    BMI 23.59 kg/m2       Physical Exam   Constitutional: She appears well-developed and well-nourished. Cardiovascular: Normal rate, regular rhythm and normal heart sounds. Pulmonary/Chest: Effort normal and breath sounds normal.   Musculoskeletal:        Right upper leg: She exhibits tenderness and swelling. Legs:  Skin bruises on medial right leg. Mild pitting edema of lower right leg. Vitals reviewed. ASSESSMENT and PLAN    ICD-10-CM ICD-9-CM    1. Right leg swelling M79.89 729.81 DUPLEX LOWER EXT VENOUS RIGHT   2. Environmental allergies Z91.09 V15.09 levocetirizine (XYZAL) 5 mg tablet     Order venous duplex scan to rule out DVT.

## 2017-05-17 ENCOUNTER — HOSPITAL ENCOUNTER (OUTPATIENT)
Dept: VASCULAR SURGERY | Age: 52
Discharge: HOME | End: 2017-05-17
Attending: FAMILY MEDICINE
Payer: COMMERCIAL

## 2017-05-17 DIAGNOSIS — M79.89 RIGHT LEG SWELLING: ICD-10-CM

## 2017-05-17 PROCEDURE — 93971 EXTREMITY STUDY: CPT

## 2017-05-17 NOTE — PROCEDURES
[address]  *** FINAL REPORT ***    Name: Carl Qureshi  MRN: [id number]    Outpatient  : 1965  HIS Order #: [id number]  [address] [facility] Visit #: [id number]  Date: 17 May 2017    TYPE OF TEST: Peripheral Venous Testing    REASON FOR TEST  Pain in limb, Limb swelling    Right Leg:-  Deep venous thrombosis:           No  Superficial venous thrombosis:    No  Deep venous insufficiency:        Not examined  Superficial venous insufficiency: Not examined      INTERPRETATION/FINDINGS  PROCEDURE:  Color duplex ultrasound imaging of lower extremity veins. FINDINGS:       Right: The common femoral, deep femoral, femoral, popliteal,  posterior tibial, peroneal, and great saphenous are patent and without   evidence of thrombus; each is is fully compressible and there is no  narrowing of the flow channel on color Doppler imaging. Phasic flow  is observed in the common femoral vein. Left:   The common femoral vein is patent and without evidence of   thrombus. Phasic flow is observed. This extremity was not otherwise   evaluated. IMPRESSION:  No evidence of right lower extremity vein thrombosis. ADDITIONAL COMMENTS    I have personally reviewed the data relevant to the interpretation of  this  study.     TECHNOLOGIST: Linn Ku RVT  Signed: 2017 09:26 AM    PHYSICIAN: Chacha Tomlin MD  Signed: 2017 06:27 AM

## 2017-05-19 ENCOUNTER — TELEPHONE (OUTPATIENT)
Dept: FAMILY MEDICINE CLINIC | Age: 52
End: 2017-05-19

## 2017-10-20 ENCOUNTER — OFFICE VISIT (OUTPATIENT)
Dept: FAMILY MEDICINE CLINIC | Age: 52
End: 2017-10-20

## 2017-10-20 ENCOUNTER — HOSPITAL ENCOUNTER (OUTPATIENT)
Dept: GENERAL RADIOLOGY | Age: 52
Discharge: HOME | End: 2017-10-20
Attending: FAMILY MEDICINE
Payer: COMMERCIAL

## 2017-10-20 VITALS
DIASTOLIC BLOOD PRESSURE: 58 MMHG | TEMPERATURE: 97.8 F | RESPIRATION RATE: 20 BRPM | OXYGEN SATURATION: 97 % | WEIGHT: 134.2 LBS | HEIGHT: 62 IN | BODY MASS INDEX: 24.69 KG/M2 | HEART RATE: 80 BPM | SYSTOLIC BLOOD PRESSURE: 94 MMHG

## 2017-10-20 DIAGNOSIS — R05.9 COUGH: Primary | ICD-10-CM

## 2017-10-20 DIAGNOSIS — R07.9 CHEST PAIN, UNSPECIFIED TYPE: ICD-10-CM

## 2017-10-20 DIAGNOSIS — R06.00 DYSPNEA, UNSPECIFIED TYPE: ICD-10-CM

## 2017-10-20 DIAGNOSIS — R05.9 COUGH: ICD-10-CM

## 2017-10-20 DIAGNOSIS — R09.1 PLEURISY: ICD-10-CM

## 2017-10-20 PROCEDURE — 71020 XR CHEST PA LAT: CPT

## 2017-10-20 RX ORDER — HYDROCODONE BITARTRATE AND ACETAMINOPHEN 5; 325 MG/1; MG/1
1 TABLET ORAL
COMMUNITY
End: 2017-10-25 | Stop reason: ALTCHOICE

## 2017-10-20 RX ORDER — ALBUTEROL SULFATE 90 UG/1
2 AEROSOL, METERED RESPIRATORY (INHALATION)
COMMUNITY
End: 2018-02-05 | Stop reason: ALTCHOICE

## 2017-10-20 RX ORDER — IBUPROFEN 200 MG
400 TABLET ORAL
Qty: 40 TAB | Refills: 0 | COMMUNITY
Start: 2017-10-20 | End: 2019-02-11

## 2017-10-20 RX ORDER — METHYLPREDNISOLONE 4 MG/1
TABLET ORAL
COMMUNITY
End: 2017-10-25 | Stop reason: ALTCHOICE

## 2017-10-20 RX ORDER — LEVOFLOXACIN 500 MG/1
500 TABLET, FILM COATED ORAL DAILY
Qty: 10 TAB | Refills: 0 | Status: SHIPPED | OUTPATIENT
Start: 2017-10-20 | End: 2017-10-30

## 2017-10-20 RX ORDER — FLUTICASONE PROPIONATE 50 MCG
2 SPRAY, SUSPENSION (ML) NASAL DAILY
COMMUNITY
End: 2019-02-11

## 2017-10-20 RX ORDER — BENZONATATE 100 MG/1
100 CAPSULE ORAL
Qty: 30 CAP | Refills: 0 | Status: SHIPPED | OUTPATIENT
Start: 2017-10-20 | End: 2018-02-05 | Stop reason: ALTCHOICE

## 2017-10-20 RX ORDER — BENZONATATE 100 MG/1
100 CAPSULE ORAL
COMMUNITY
End: 2017-10-20 | Stop reason: SDUPTHER

## 2017-10-20 NOTE — PROGRESS NOTES
Identified pt with two pt identifiers(name and ). Chief Complaint   Patient presents with    Cold Symptoms     started a month ago - went to [facility] 10/15/17    Fever    Cough     now coughing fits     Chest Pain      left side of chest under arm started sharp pain         There are no preventive care reminders to display for this patient. Wt Readings from Last 3 Encounters:   10/20/17 134 lb 3.2 oz (60.9 kg)   05/15/17 129 lb (58.5 kg)   17 133 lb 3.2 oz (60.4 kg)     Temp Readings from Last 3 Encounters:   10/20/17 97.8 °F (36.6 °C) (Oral)   05/15/17 98 °F (36.7 °C) (Oral)   17 97.8 °F (36.6 °C) (Oral)     BP Readings from Last 3 Encounters:   10/20/17 94/58   05/15/17 115/70   17 115/68     Pulse Readings from Last 3 Encounters:   10/20/17 80   05/15/17 78   17 72         Learning Assessment:  :     Learning Assessment 2/3/2014   PRIMARY LEARNER Patient   HIGHEST LEVEL OF EDUCATION - PRIMARY LEARNER  DID NOT GRADUATE HIGH SCHOOL   BARRIERS PRIMARY LEARNER NONE   CO-LEARNER CAREGIVER No   PRIMARY LANGUAGE OTHER (COMMENT)   LEARNER PREFERENCE PRIMARY LISTENING     DEMONSTRATION   ANSWERED BY SELF   RELATIONSHIP SELF       Depression Screening:  :     PHQ over the last two weeks 10/20/2017   Little interest or pleasure in doing things Not at all   Feeling down, depressed or hopeless Not at all   Total Score PHQ 2 0       Fall Risk Assessment:  :     Fall Risk Assessment, last 12 mths 10/20/2017   Able to walk? Yes   Fall in past 12 months? No       Abuse Screening:  :     Abuse Screening Questionnaire 10/20/2017 2017 3/16/2015   Do you ever feel afraid of your partner? N N N   Are you in a relationship with someone who physically or mentally threatens you? N N N   Is it safe for you to go home? Marylen Bath       Coordination of Care Questionnaire:  :     1) Have you been to an emergency room, urgent care clinic since your last visit?  yes [facility] 10/15/17 Hospitalized since your last visit? no             2) Have you seen or consulted any other health care providers outside of [address] [place] [address] since your last visit? yes  Dr Maegan Box (Include any pap smears or colon screenings in this section.)    3) Do you have an Advance Directive on file? no  Are you interested in receiving information about Advance Directives? no    Patient is accompanied by spouse I have received verbal consent from [place] to discuss any/all medical information while they are present in the room. Reviewed record in preparation for visit and have obtained necessary documentation. Medication reconciliation up to date and corrected with patient at this time.

## 2017-10-20 NOTE — LETTER
NOTIFICATION RETURN TO WORK / SCHOOL 
 
10/20/2017 4:45 PM 
 
[address] [address] To Whom It May Concern: 
 
Lukas Moses is currently under the care of [address] [place] [address]. She will return to work on: 10/25/2017. If there are questions or concerns please have the patient contact our office. Sincerely, Yaa Burch MD

## 2017-10-20 NOTE — PROGRESS NOTES
Subjective:   Tri Gilmore is a 46 y.o. female who complains of congestion, sore throat, productive cough, myalgias, fever, chills and hoarseness for 4 weels, unchanged since that time. She has pain in left chest wall. Denies SOB. She has kept working even with illness. Evaluation to date: seen at [facility] 6 days ago. Treatment to date: cough suppressants, nasal steroids, Medrol pack, Albuterol inhaler prescribed at [facility]. CXR done at clinic, told negative for PNA. Patient does not smoke cigarettes. Relevant PMH: . Patient Active Problem List    Diagnosis Date Noted    Rheumatoid arthritis ([facility] 12/07/2015    Carpal tunnel syndrome 12/07/2015    Gastroparesis 12/28/2011    GERD (gastroesophageal reflux disease) 06/30/2011     Allergies   Allergen Reactions    Biaxin [Clarithromycin] Shortness of Breath and Swelling    Ethanol (Ethyl Alcohol) Other (comments)     Cannot digest    Tormentil Other (comments)     redness     Past Medical History:   Diagnosis Date    Bursitis/tendonitis, shoulder     Gastroparesis     GERD (gastroesophageal reflux disease)     Rheumatoid arthritis (H[facility])     Rheumatoid arthritis ([facility]       Current Outpatient Prescriptions on File Prior to Visit   Medication Sig Dispense Refill    multivitamin (ONE A DAY) tablet Take 1 Tab by mouth daily.  folic acid (FOLVITE) 1 mg tablet Take 1 mg by mouth daily. 11    methotrexate (RHEUMATREX) 2.5 mg tablet Take 25 mg by mouth every Sunday. 5    GLUCOSAMINE/CHONDRO SALAZAR A (COSAMIN DS PO) Take  by mouth daily. daily, 0 Refills      polyethylene glycol (MIRALAX) 17 gram/dose powder  g, PO, daily, 0 Refills      cholecalciferol, vitamin D3, (VITAMIN D3) 2,000 unit tab 4,000 Units. takes sometimes, 0 Refills      B.infantis-B.ani-B.long-B.bifi (PROBIOTIC 4X) 10-15 mg TbEC Take  by mouth.  FISH  mg cap Take  by mouth.  calcium 500 mg tab Take  by mouth.        No current facility-administered medications on file prior to visit. Review of Systems  Pertinent items are noted in HPI. Objective:     Visit Vitals    BP 94/58 (BP 1 Location: Right arm, BP Patient Position: Sitting)    Pulse 80    Temp 97.8 °F (36.6 °C) (Oral)    Resp 20    Ht 5' 2\" (1.575 m)    Wt 134 lb 3.2 oz (60.9 kg)    LMP 02/10/2015    SpO2 97%    BMI 24.55 kg/m2     General:  alert, cooperative, no distress   Eyes: negative   Ears: normal TM's and external ear canals AU   Sinuses: Normal paranasal sinuses without tenderness   Mouth:  Lips, mucosa, and tongue normal. Teeth and gums normal   Neck: supple, symmetrical, trachea midline and no adenopathy. Heart: S1 and S2 normal, no murmurs noted. Lungs: clear to auscultation bilaterally   Abdomen:       Chest wall: non tender but localizes pain left lateral wall under arm. Assessment/Plan:   bronchitis and bronchospasm      ICD-10-CM ICD-9-CM    1. Cough R05 786.2 levoFLOXacin (LEVAQUIN) 500 mg tablet      XR CHEST PA LAT      benzonatate (TESSALON) 100 mg capsule   2. Dyspnea, unspecified type R06.00 786.09    3. Chest pain, unspecified type R07.9 786.50 XR CHEST PA LAT   4. Pleurisy R09.1 511.0 ibuprofen (MOTRIN IB) 200 mg tablet   . Start on Levaquin. Check CXR. Cont Albuterol inhaler. Instructed on correct use of MDI.  recommend home rest.  FU next week.

## 2017-10-20 NOTE — PATIENT INSTRUCTIONS
Using a Metered-Dose Inhaler: Care Instructions  Your Care Instructions    A metered-dose inhaler lets you breathe medicine into your lungs quickly. Inhaled medicine works faster than the same medicine in a pill. An inhaler allows you to take less medicine than you would need if you took it as a pill. \"Metered-dose\" means that the inhaler gives a measured amount of medicine each time you use it. A metered-dose inhaler gives medicine in the form of a liquid mist.  Your doctor may want you to use a spacer with your inhaler. A spacer is a chamber that you attach to the inhaler. The chamber holds the medicine before you inhale it. That way, you can inhale the medicine in as many breaths as you need. Doctors recommend using a spacer with most metered-dose inhalers, especially those with corticosteroid medicines. Follow-up care is a key part of your treatment and safety. Be sure to make and go to all appointments, and call your doctor if you are having problems. It's also a good idea to know your test results and keep a list of the medicines you take. How can you care for yourself at home? To get started using your inhaler  · Talk with your health care provider to be sure you are using your inhaler the right way. It might help if you practice using it in front of a mirror. Use the inhaler exactly as prescribed. · Check that you have the correct medicine. If you use more than one inhaler, put a label on each one. This will let you know which one to use at the right time. · Keep track of how much medicine is in the inhaler. Check the label to see how many doses are in the container. If you know how many puffs you can take, you can replace the inhaler before you run out. Ask your health care provider how you can keep track of how much medicine is left. · Use a spacer if you have problems pressing the inhaler and breathing in at the same time.  You also may need a spacer if you are using corticosteroid medicines. · If you are using a corticosteroid inhaler, gargle and rinse out your mouth with water after use. Do not swallow the water. Swallowing the water will increase the chance that the medicine will get into your bloodstream. This may make it more likely that you will have side effects. To use a spacer with an inhaler  1. Shake the inhaler. Remove the inhaler cap, and place the mouthpiece of the inhaler into the spacer. Check the inhaler instructions to see if you need to prime your inhaler before you use it. If it needs priming, follow the instructions on how to prime your inhaler. 2. Remove the cap from the spacer. 3. Hold the inhaler upright with the mouthpiece at the bottom. 4. Tilt your head back a little, and breathe out slowly and completely. 5. Place the spacer's mouthpiece in your mouth. 6. Press down on the inhaler to spray one puff of medicine into the spacer, and then start breathing in slowly. Wait to inhale until after you have pressed down on the inhaler. Some spacers have a whistle. If you hear it, you should breathe in more slowly. 7. Hold your breath for 10 seconds. This will let the medicine settle in your lungs. 8. If you need to take a second dose, wait 30 to 60 seconds to allow the inhaler valve to refill. To use an inhaler without a spacer  1. Shake the inhaler as directed. Remove the cap. Check the instructions to see if you need to prime your inhaler before you use it. If it needs priming, follow the instructions on how to prime your inhaler. 2. Hold the inhaler upright with the mouthpiece at the bottom. 3. Tilt your head back a little, and breathe out slowly and completely. 4. Position the inhaler in one of two ways:  ¨ You can place the inhaler in your mouth. This is easier for most people. And it lowers the risk that any of the medicine will get into your eyes. ¨ Or you can place the inhaler 1 to 2 inches in front of your open mouth, without closing your lips over it. Try to open your mouth as wide as you can. Placing the inhaler in front of your open mouth may be better for getting the medicine into your lungs. But some people may find this too hard to do. 5. Start taking slow, even breaths through your mouth. Press down on the inhaler once, then inhale fully. 6. Hold your breath for 10 seconds. This will let the medicine settle in your lungs. 7. If you need to take a second dose, wait 30 to 60 seconds to allow the inhaler valve to refill. Where can you learn more? Go to http://bell-thom.info/. Enter K111 in the search box to learn more about \"Using a Metered-Dose Inhaler: Care Instructions. \"  Current as of: March 25, 2017  Content Version: 11.3  © 8879-7150 [employer]. Care instructions adapted under license by [employer] (which disclaims liability or warranty for this information). If you have questions about a medical condition or this instruction, always ask your healthcare professional. [place] [address] any warranty or liability for your use of this information.

## 2017-10-20 NOTE — MR AVS SNAPSHOT
Visit Information Date & Time Provider Department Dept. Phone Encounter #  
 [id number]  2:55 PM [facility] [phone] [id number] Upcoming Health Maintenance Date Due  
 BREAST CANCER SCRN MAMMOGRAM 12/28/2018 PAP AKA CERVICAL CYTOLOGY 1/9/2020 COLONOSCOPY 4/4/2023 DTaP/Tdap/Td series (3 - Td) 1/9/2027 Allergies as of 10/20/2017  Review Complete On: 45/30/9809 By: Mireya Gaffney MD  
  
 Severity Noted Reaction Type Reactions Biaxin [Clarithromycin] High 06/29/2011    Shortness of Breath, Swelling Ethanol (Ethyl Alcohol)  06/29/2011    Other (comments) Cannot digest  
 Tormentil  06/29/2011    Other (comments)  
 redness Current Immunizations  Reviewed on 12/7/2015 Name Date Tdap 4/26/2005 Not reviewed this visit You Were Diagnosed With   
  
 Codes Comments Cough    -  Primary ICD-10-CM: S29 ICD-9-CM: 406. 2 Dyspnea, unspecified type     ICD-10-CM: R06.00 
ICD-9-CM: 786.09 Chest pain, unspecified type     ICD-10-CM: R07.9 ICD-9-CM: 786.50 Pleurisy     ICD-10-CM: R09.1 ICD-9-CM: 511.0 Vitals BP Pulse Temp Resp Height(growth percentile) Weight(growth percentile) 94/58 (BP 1 Location: Right arm, BP Patient Position: Sitting) 80 97.8 °F (36.6 °C) (Oral) 20 5' 2\" (1.575 m) 134 lb 3.2 oz (60.9 kg) LMP SpO2 BMI OB Status Smoking Status 02/10/2015 97% 24.55 kg/m2 Postmenopausal Never Smoker Vitals History BMI and BSA Data Body Mass Index Body Surface Area 24.55 kg/m 2 1.63 m 2 Preferred Pharmacy Pharmacy Name Phone [id number] Jesusia Justino, [address] [place], [place] [phone] Your Updated Medication List  
  
   
This list is accurate as of: 10/20/17  4:55 PM.  Always use your most recent med list.  
  
  
  
  
 benzonatate 100 mg capsule Commonly known as:  TESSALON  
 Take 1 Cap by mouth three (3) times daily as needed for Cough. calcium 500 mg Tab Take  by mouth. COSAMIN DS PO Take  by mouth daily. daily, 0 Refills FISH  mg Cap Generic drug:  Omega-3 Fatty Acids Take  by mouth. fluticasone 50 mcg/actuation nasal spray Commonly known as:  Amanda Serene 2 Sprays by Both Nostrils route daily. folic acid 1 mg tablet Commonly known as:  Google Take 1 mg by mouth daily. HYDROcodone-acetaminophen 5-325 mg per tablet Commonly known as:  Barnet Cuff Take 1 Tab by mouth every six (6) hours as needed for Pain.  
  
 levoFLOXacin 500 mg tablet Commonly known as:  Vienna Oar Take 1 Tab by mouth daily for 10 days. methotrexate 2.5 mg tablet Commonly known as:  Mona Putt Take 25 mg by mouth every Sunday. methylPREDNISolone 4 mg Tab Commonly known as:  MEDROL Take  by mouth daily (with breakfast). MIRALAX 17 gram/dose powder Generic drug:  polyethylene glycol  
g, PO, daily, 0 Refills MOTRIN  mg tablet Generic drug:  ibuprofen Take 2 Tabs by mouth every six (6) hours as needed for Pain.  
  
 multivitamin tablet Commonly known as:  ONE A DAY Take 1 Tab by mouth daily. PROAIR HFA 90 mcg/actuation inhaler Generic drug:  albuterol Take 2 Puffs by inhalation every six (6) hours as needed for Wheezing. PROBIOTIC 4X 10-15 mg Tbec Generic drug:  B.infantis-B.ani-B.long-B.bifi Take  by mouth. VITAMIN D3 2,000 unit Tab Generic drug:  cholecalciferol (vitamin D3)  
4,000 Units. takes sometimes, 0 Refills Prescriptions Sent to Pharmacy Refills  
 levoFLOXacin (LEVAQUIN) 500 mg tablet 0 Sig: Take 1 Tab by mouth daily for 10 days. Class: Normal  
 Pharmacy: [facility], [address] [place] [address] Ph #: [phone]  Route: Oral  
 benzonatate (TESSALON) 100 mg capsule 0  
 Sig: Take 1 Cap by mouth three (3) times daily as needed for Cough. Class: Normal  
 [facility], [address] #: [phone] Route: Oral  
  
To-Do List   
 10/20/2017 Imaging:  XR CHEST PA LAT Patient Instructions Using a Metered-Dose Inhaler: Care Instructions Your Care Instructions A metered-dose inhaler lets you breathe medicine into your lungs quickly. Inhaled medicine works faster than the same medicine in a pill. An inhaler allows you to take less medicine than you would need if you took it as a pill. \"Metered-dose\" means that the inhaler gives a measured amount of medicine each time you use it. A metered-dose inhaler gives medicine in the form of a liquid mist. 
Your doctor may want you to use a spacer with your inhaler. A spacer is a chamber that you attach to the inhaler. The chamber holds the medicine before you inhale it. That way, you can inhale the medicine in as many breaths as you need. Doctors recommend using a spacer with most metered-dose inhalers, especially those with corticosteroid medicines. Follow-up care is a key part of your treatment and safety. Be sure to make and go to all appointments, and call your doctor if you are having problems. It's also a good idea to know your test results and keep a list of the medicines you take. How can you care for yourself at home? To get started using your inhaler · Talk with your health care provider to be sure you are using your inhaler the right way. It might help if you practice using it in front of a mirror. Use the inhaler exactly as prescribed. · Check that you have the correct medicine. If you use more than one inhaler, put a label on each one. This will let you know which one to use at the right time. · Keep track of how much medicine is in the inhaler.  Check the label to see how many doses are in the container. If you know how many puffs you can take, you can replace the inhaler before you run out. Ask your health care provider how you can keep track of how much medicine is left. · Use a spacer if you have problems pressing the inhaler and breathing in at the same time. You also may need a spacer if you are using corticosteroid medicines. · If you are using a corticosteroid inhaler, gargle and rinse out your mouth with water after use. Do not swallow the water. Swallowing the water will increase the chance that the medicine will get into your bloodstream. This may make it more likely that you will have side effects. To use a spacer with an inhaler 1. Shake the inhaler. Remove the inhaler cap, and place the mouthpiece of the inhaler into the spacer. Check the inhaler instructions to see if you need to prime your inhaler before you use it. If it needs priming, follow the instructions on how to prime your inhaler. 2. Remove the cap from the spacer. 3. Hold the inhaler upright with the mouthpiece at the bottom. 4. Tilt your head back a little, and breathe out slowly and completely. 5. Place the spacer's mouthpiece in your mouth. 6. Press down on the inhaler to spray one puff of medicine into the spacer, and then start breathing in slowly. Wait to inhale until after you have pressed down on the inhaler. Some spacers have a whistle. If you hear it, you should breathe in more slowly. 7. Hold your breath for 10 seconds. This will let the medicine settle in your lungs. 8. If you need to take a second dose, wait 30 to 60 seconds to allow the inhaler valve to refill. To use an inhaler without a spacer 1. Shake the inhaler as directed. Remove the cap. Check the instructions to see if you need to prime your inhaler before you use it. If it needs priming, follow the instructions on how to prime your inhaler. 2. Hold the inhaler upright with the mouthpiece at the bottom. 3. Tilt your head back a little, and breathe out slowly and completely. 4. Position the inhaler in one of two ways: 
¨ You can place the inhaler in your mouth. This is easier for most people. And it lowers the risk that any of the medicine will get into your eyes. ¨ Or you can place the inhaler 1 to 2 inches in front of your open mouth, without closing your lips over it. Try to open your mouth as wide as you can. Placing the inhaler in front of your open mouth may be better for getting the medicine into your lungs. But some people may find this too hard to do. 5. Start taking slow, even breaths through your mouth. Press down on the inhaler once, then inhale fully. 6. Hold your breath for 10 seconds. This will let the medicine settle in your lungs. 7. If you need to take a second dose, wait 30 to 60 seconds to allow the inhaler valve to refill. Where can you learn more? Go to http://bell-thom.info/. Enter K111 in the search box to learn more about \"Using a Metered-Dose Inhaler: Care Instructions. \" Current as of: March 25, 2017 Content Version: 11.3 © 9756-0273 [employer]. Care instructions adapted under license by [employer] (which disclaims liability or warranty for this information). If you have questions about a medical condition or this instruction, always ask your healthcare professional. [place] [address] any warranty or liability for your use of this information. Introducing [facility]! [employer] introduces Outlisten patient portal. Now you can access parts of your medical record, email your doctor's office, and request medication refills online. 1. In your internet browser, go to [email or web address] 2. Click on the First Time User? Click Here link in the Sign In box. You will see the New Member Sign Up page. 3. Enter your Outlisten Access Code exactly as it appears below.  You will not need to use this code after youve completed the sign-up process. If you do not sign up before the expiration date, you must request a new code. · My Ad Box Access Code: [email or web address] Expires: 1/18/2018  4:48 PM 
 
4. Enter the last four digits of your Social Security Number (xxxx) and Date of Birth (mm/dd/yyyy) as indicated and click Submit. You will be taken to the next sign-up page. 5. Create a My Ad Box ID. This will be your My Ad Box login ID and cannot be changed, so think of one that is secure and easy to remember. 6. Create a My Ad Box password. You can change your password at any time. 7. Enter your Password Reset Question and Answer. This can be used at a later time if you forget your password. 8. Enter your e-mail address. You will receive e-mail notification when new information is available in [address]. 9. Click Sign Up. You can now view and download portions of your medical record. 10. Click the Download Summary menu link to download a portable copy of your medical information. If you have questions, please visit the Frequently Asked Questions section of the My Ad Box website. Remember, My Ad Box is NOT to be used for urgent needs. For medical emergencies, dial 911. Now available from your iPhone and Android! Please provide this summary of care documentation to your next provider. Your primary care clinician is listed as Radha Oliveira. If you have any questions after today's visit, please call [phone].

## 2017-10-25 ENCOUNTER — OFFICE VISIT (OUTPATIENT)
Dept: FAMILY MEDICINE CLINIC | Age: 52
End: 2017-10-25

## 2017-10-25 VITALS
BODY MASS INDEX: 24.66 KG/M2 | RESPIRATION RATE: 20 BRPM | HEART RATE: 75 BPM | DIASTOLIC BLOOD PRESSURE: 60 MMHG | HEIGHT: 62 IN | TEMPERATURE: 97.6 F | WEIGHT: 134 LBS | OXYGEN SATURATION: 100 % | SYSTOLIC BLOOD PRESSURE: 101 MMHG

## 2017-10-25 DIAGNOSIS — J40 BRONCHITIS: Primary | ICD-10-CM

## 2017-10-25 NOTE — PROGRESS NOTES
Identified pt with two pt identifiers(name and ). Chief Complaint   Patient presents with    Cough     still has cough    Fever     off and on     Chest Pain     hurts on left side    Sinus Infection        There are no preventive care reminders to display for this patient. Wt Readings from Last 3 Encounters:   10/25/17 134 lb (60.8 kg)   10/20/17 134 lb 3.2 oz (60.9 kg)   05/15/17 129 lb (58.5 kg)     Temp Readings from Last 3 Encounters:   10/25/17 97.6 °F (36.4 °C) (Oral)   10/20/17 97.8 °F (36.6 °C) (Oral)   05/15/17 98 °F (36.7 °C) (Oral)     BP Readings from Last 3 Encounters:   10/25/17 101/60   10/20/17 94/58   05/15/17 115/70     Pulse Readings from Last 3 Encounters:   10/25/17 75   10/20/17 80   05/15/17 78         Learning Assessment:  :     Learning Assessment 2/3/2014   PRIMARY LEARNER Patient   HIGHEST LEVEL OF EDUCATION - PRIMARY LEARNER  DID NOT GRADUATE HIGH SCHOOL   BARRIERS PRIMARY LEARNER NONE   CO-LEARNER CAREGIVER No   PRIMARY LANGUAGE OTHER (COMMENT)   LEARNER PREFERENCE PRIMARY LISTENING     DEMONSTRATION   ANSWERED BY SELF   RELATIONSHIP SELF       Depression Screening:  :     PHQ over the last two weeks 10/20/2017   Little interest or pleasure in doing things Not at all   Feeling down, depressed or hopeless Not at all   Total Score PHQ 2 0       Fall Risk Assessment:  :     Fall Risk Assessment, last 12 mths 10/20/2017   Able to walk? Yes   Fall in past 12 months? No       Abuse Screening:  :     Abuse Screening Questionnaire 10/20/2017 2017 3/16/2015   Do you ever feel afraid of your partner? N N N   Are you in a relationship with someone who physically or mentally threatens you? N N N   Is it safe for you to go home?  Y Y Y       Coordination of Care Questionnaire:  :     1) Have you been to an emergency room, urgent care clinic since your last visit? no   Hospitalized since your last visit? no             2) Have you seen or consulted any other health care providers outside of [address] [place] [address] since your last visit? no  (Include any pap smears or colon screenings in this section.)    3) Do you have an Advance Directive on file? no  Are you interested in receiving information about Advance Directives? no    Patient is accompanied by spouse I have received verbal consent from Yvette Issa to discuss any/all medical information while they are present in the room. Reviewed record in preparation for visit and have obtained necessary documentation. Medication reconciliation up to date and corrected with patient at this time.

## 2017-10-25 NOTE — LETTER
NOTIFICATION RETURN TO WORK / SCHOOL 
 
10/25/2017 4:48 PM 
 
Ms. [address] [place] [address] To Whom It May Concern: 
 
Jone Choudhury is currently under the care of [address] [place] [address]. She will return to work on: 11/1/2017. If there are questions or concerns please have the patient contact our office. Sincerely, Ankit Trevino MD

## 2017-10-25 NOTE — MR AVS SNAPSHOT
Visit Information Date & Time Provider Department Dept. Phone Encounter #  
 [id number]  2:18 PM Ayana Naranjo Damir Santhosh [phone] [id number] Upcoming Health Maintenance Date Due  
 BREAST CANCER SCRN MAMMOGRAM 12/28/2018 PAP AKA CERVICAL CYTOLOGY 1/9/2020 COLONOSCOPY 4/4/2023 DTaP/Tdap/Td series (3 - Td) 1/9/2027 Allergies as of 10/25/2017  Review Complete On: 39/66/2969 By: Ayana Naranjo MD  
  
 Severity Noted Reaction Type Reactions Biaxin [Clarithromycin] High 06/29/2011    Shortness of Breath, Swelling Ethanol (Ethyl Alcohol)  06/29/2011    Other (comments) Cannot digest  
 Tormentil  06/29/2011    Other (comments)  
 redness Current Immunizations  Reviewed on 12/7/2015 Name Date Tdap 4/26/2005 Not reviewed this visit You Were Diagnosed With   
  
 Codes Comments Bronchitis    -  Primary ICD-10-CM: Y29 ICD-9-CM: 435 Vitals BP Pulse Temp Resp Height(growth percentile) Weight(growth percentile) 101/60 (BP 1 Location: Left arm, BP Patient Position: Sitting) 75 97.6 °F (36.4 °C) (Oral) 20 5' 2\" (1.575 m) 134 lb (60.8 kg) LMP SpO2 BMI OB Status Smoking Status 02/10/2015 100% 24.51 kg/m2 Postmenopausal Never Smoker Vitals History BMI and BSA Data Body Mass Index Body Surface Area 24.51 kg/m 2 1.63 m 2 Preferred Pharmacy Pharmacy Name Phone [id number] Mima Todd, [address] [place] [phone] Your Updated Medication List  
  
   
This list is accurate as of: 10/25/17  4:55 PM.  Always use your most recent med list.  
  
  
  
  
 benzonatate 100 mg capsule Commonly known as:  TESSALON Take 1 Cap by mouth three (3) times daily as needed for Cough. calcium 500 mg Tab Take  by mouth. COSAMIN DS PO Take  by mouth daily. daily, 0 Refills FISH  mg Cap Generic drug:  Omega-3 Fatty Acids Take  by mouth. fluticasone 50 mcg/actuation nasal spray Commonly known as:  Lennice Boss 2 Sprays by Both Nostrils route daily. folic acid 1 mg tablet Commonly known as:  Google Take 1 mg by mouth daily. guaiFENesin 1,200 mg Ta12 ER tablet Commonly known as:  [place] [address] Take 1 Tab by mouth two (2) times a day. Indications: Cough  
  
 levoFLOXacin 500 mg tablet Commonly known as:  Joann Born Take 1 Tab by mouth daily for 10 days. methotrexate 2.5 mg tablet Commonly known as:  Lavona Martir Take 25 mg by mouth every Sunday. MIRALAX 17 gram/dose powder Generic drug:  polyethylene glycol  
g, PO, daily, 0 Refills MOTRIN  mg tablet Generic drug:  ibuprofen Take 2 Tabs by mouth every six (6) hours as needed for Pain.  
  
 multivitamin tablet Commonly known as:  ONE A DAY Take 1 Tab by mouth daily. PROAIR HFA 90 mcg/actuation inhaler Generic drug:  albuterol Take 2 Puffs by inhalation every six (6) hours as needed for Wheezing. PROBIOTIC 4X 10-15 mg Tbec Generic drug:  B.infantis-B.ani-B.long-B.bifi Take  by mouth. VITAMIN D3 2,000 unit Tab Generic drug:  cholecalciferol (vitamin D3)  
4,000 Units. takes sometimes, 0 Refills Prescriptions Sent to Pharmacy Refills  
 guaiFENesin (MUCINEX) 1,200 mg Ta12 ER tablet 0 Sig: Take 1 Tab by mouth two (2) times a day. Indications: Cough Class: Normal  
 Pharmacy: [facility]/pharmacy [address] [place] [address], [address] [place] [address] Ph #: [phone] Route: Oral  
  
Introducing [facility]! [facility] introduces UGAME patient portal. Now you can access parts of your medical record, email your doctor's office, and request medication refills online. 1. In your internet browser, go to [email or web address]. [employer] 2. Click on the First Time User? Click Here link in the Sign In box. You will see the New Member Sign Up page. 3. Enter your Stoke Access Code exactly as it appears below. You will not need to use this code after youve completed the sign-up process. If you do not sign up before the expiration date, you must request a new code. · Stoke Access Code: [email or web address] Expires: 1/18/2018  4:48 PM 
 
4. Enter the last four digits of your Social Security Number (xxxx) and Date of Birth (mm/dd/yyyy) as indicated and click Submit. You will be taken to the next sign-up page. 5. Create a Stoke ID. This will be your Stoke login ID and cannot be changed, so think of one that is secure and easy to remember. 6. Create a Stoke password. You can change your password at any time. 7. Enter your Password Reset Question and Answer. This can be used at a later time if you forget your password. 8. Enter your e-mail address. You will receive e-mail notification when new information is available in [address]. 9. Click Sign Up. You can now view and download portions of your medical record. 10. Click the Download Summary menu link to download a portable copy of your medical information. If you have questions, please visit the Frequently Asked Questions section of the Stoke website. Remember, Stoke is NOT to be used for urgent needs. For medical emergencies, dial 911. Now available from your iPhone and Android! Please provide this summary of care documentation to your next provider. Your primary care clinician is listed as Mikaela Gerard. If you have any questions after today's visit, please call [phone].

## 2017-10-26 NOTE — PROGRESS NOTES
HISTORY OF PRESENT ILLNESS  Bruno Guido is a 46 y.o. female. HPI  FU bronchitis. CXR was negative for PNA or rib fractures. Still coughing in fits. Productive of thick phlegm. Some fever. She went to work today. According to  she is not resting at home. Review of Systems   Constitutional: Positive for fever and malaise/fatigue. Respiratory: Positive for cough and shortness of breath. Cardiovascular: Positive for chest pain. All other systems reviewed and are negative. Visit Vitals    /60 (BP 1 Location: Left arm, BP Patient Position: Sitting)    Pulse 75    Temp 97.6 °F (36.4 °C) (Oral)    Resp 20    Ht 5' 2\" (1.575 m)    Wt 134 lb (60.8 kg)    LMP 02/10/2015    SpO2 100%    BMI 24.51 kg/m2       Physical Exam   Constitutional: She appears well-developed and well-nourished. Cardiovascular: Normal rate and normal heart sounds. Pulmonary/Chest: Effort normal and breath sounds normal. She exhibits tenderness. Vitals reviewed. ASSESSMENT and PLAN    ICD-10-CM ICD-9-CM    1. Bronchitis J40 490 guaiFENesin (MUCINEX) 1,200 mg Ta12 ER tablet     Finish up Levaquin.   Add Mucinex  Home rest.

## 2018-01-15 ENCOUNTER — TELEPHONE (OUTPATIENT)
Dept: FAMILY MEDICINE CLINIC | Age: 53
End: 2018-01-15

## 2018-01-15 DIAGNOSIS — Z00.00 ROUTINE ADULT HEALTH MAINTENANCE: Primary | ICD-10-CM

## 2018-01-15 DIAGNOSIS — E55.9 VITAMIN D DEFICIENCY: ICD-10-CM

## 2018-01-15 NOTE — TELEPHONE ENCOUNTER
Patient's  called to schedule patient physical appointment. Patient is wanting to come in prior to have labs done.

## 2018-01-15 NOTE — LETTER
2/8/2018 11:13 AM 
 
Ms. [address] [place] [phone] Dear Edilberto Huston: 
 
Please find your most recent results below. Resulted Orders CBC WITH AUTOMATED DIFF Result Value Ref Range WBC 5.4 3.4 - 10.8 x10E3/uL  
 RBC 4.40 3.77 - 5.28 x10E6/uL HGB 12.0 11.1 - 15.9 g/dL HCT 37.3 34.0 - 46.6 % MCV 85 79 - 97 fL  
 MCH 27.3 26.6 - 33.0 pg  
 MCHC 32.2 31.5 - 35.7 g/dL  
 RDW 14.5 12.3 - 15.4 % PLATELET 078 577 - 706 x10E3/uL NEUTROPHILS 62 Not Estab. % Lymphocytes 27 Not Estab. % MONOCYTES 9 Not Estab. % EOSINOPHILS 2 Not Estab. % BASOPHILS 0 Not Estab. %  
 ABS. NEUTROPHILS 3.3 1.4 - 7.0 x10E3/uL Abs Lymphocytes 1.5 0.7 - 3.1 x10E3/uL  
 ABS. MONOCYTES 0.5 0.1 - 0.9 x10E3/uL  
 ABS. EOSINOPHILS 0.1 0.0 - 0.4 x10E3/uL  
 ABS. BASOPHILS 0.0 0.0 - 0.2 x10E3/uL IMMATURE GRANULOCYTES 0 Not Estab. %  
 ABS. IMM. GRANS. 0.0 0.0 - 0.1 x10E3/uL Narrative Performed at:  [address]  [id number] : Saul Castillo MD, Phone:  [phone] METABOLIC PANEL, COMPREHENSIVE Result Value Ref Range Glucose 76 65 - 99 mg/dL BUN 9 6 - 24 mg/dL Creatinine 0.58 0.57 - 1.00 mg/dL GFR est non- >59 mL/min/1.73 GFR est  >59 mL/min/1.73  
 BUN/Creatinine ratio 16 9 - 23 Sodium 140 134 - 144 mmol/L Potassium 3.7 3.5 - 5.2 mmol/L Chloride 97 96 - 106 mmol/L  
 CO2 24 18 - 29 mmol/L Calcium 9.6 8.7 - 10.2 mg/dL Protein, total 7.7 6.0 - 8.5 g/dL Albumin 4.8 3.5 - 5.5 g/dL GLOBULIN, TOTAL 2.9 1.5 - 4.5 g/dL A-G Ratio 1.7 1.2 - 2.2 Bilirubin, total 0.5 0.0 - 1.2 mg/dL Alk. phosphatase 75 39 - 117 IU/L  
 AST (SGOT) 28 0 - 40 IU/L  
 ALT (SGPT) 24 0 - 32 IU/L Narrative Performed at:  [address]  [id number] : Saul Castillo MD, Phone:  [phone] LIPID PANEL  
 Result Value Ref Range Cholesterol, total 210 (H) 100 - 199 mg/dL Triglyceride 53 0 - 149 mg/dL HDL Cholesterol 87 >39 mg/dL VLDL, calculated 11 5 - 40 mg/dL LDL, calculated 112 (H) 0 - 99 mg/dL Narrative Performed at:  [address]  [id number] : Alfredo Wells MD, Phone:  [phone] TSH 3RD GENERATION Result Value Ref Range TSH 1.320 0.450 - 4.500 uIU/mL Narrative Performed at:  [address]  [id number] : Alfredo Wells MD, Phone:  [phone] VITAMIN D, 25 HYDROXY Result Value Ref Range VITAMIN D, 25-HYDROXY 53.9 30.0 - 100.0 ng/mL Comment:  
   Vitamin D deficiency has been defined by the [address] [place] [address] practice guideline as a 
level of serum 25-OH vitamin D less than 20 ng/mL (1,2). The Endocrine Society went on to further define vitamin D 
insufficiency as a level between 21 and 29 ng/mL (2). 1. IOM ([place] of Medicine). 2010. Dietary reference 
   intakes for calcium and D. [address] [place] [address]: The 
   [place]. 2. Grey MF, Cuong NC, Davon RAYMOND, et al. 
   Evaluation, treatment, and prevention of vitamin D 
   deficiency: an Endocrine Society clinical practice 
   guideline. JCEM. 2011 Jul; 96(7):1911-30. Narrative Performed at:  [address]  [id number] : Alfredo Wells MD, Phone:  [phone] HEMOGLOBIN A1C WITH EAG Result Value Ref Range Hemoglobin A1c 5.6 4.8 - 5.6 % Comment:  
            Pre-diabetes: 5.7 - 6.4 Diabetes: >6.4 Glycemic control for adults with diabetes: <7.0 Estimated average glucose 114 mg/dL Narrative Performed at:  [address]  [id number] : Alfredo Wells MD, Phone:  [phone] CVD REPORT  
 Result Value Ref Range INTERPRETATION Note Comment:  
   Supplemental report is available. Narrative Performed at:  [address] [place] [address]  [id number] : Laurence Hamm PhD, Phone:  [phone] RECOMMENDATIONS: 
Lab results look great! Normal blood cell counts. Normal sugar, liver, and kidney tests. Normal thyroid level. Normal Vit D level. Improved cholesterol numbers.  Keep up the good work!!  
 
 
Please call me if you have any questions: [phone] Sincerely, Fernie Lovett MD

## 2018-01-23 ENCOUNTER — HOSPITAL ENCOUNTER (OUTPATIENT)
Dept: MAMMOGRAPHY | Age: 53
Discharge: HOME | End: 2018-01-23
Attending: FAMILY MEDICINE
Payer: COMMERCIAL

## 2018-01-23 DIAGNOSIS — Z12.39 SCREENING BREAST EXAMINATION: ICD-10-CM

## 2018-01-23 PROCEDURE — 77067 SCR MAMMO BI INCL CAD: CPT

## 2018-02-05 ENCOUNTER — OFFICE VISIT (OUTPATIENT)
Dept: FAMILY MEDICINE CLINIC | Age: 53
End: 2018-02-05

## 2018-02-05 VITALS
HEART RATE: 82 BPM | WEIGHT: 133.2 LBS | RESPIRATION RATE: 18 BRPM | TEMPERATURE: 98 F | HEIGHT: 62 IN | OXYGEN SATURATION: 98 % | BODY MASS INDEX: 24.51 KG/M2 | DIASTOLIC BLOOD PRESSURE: 70 MMHG | SYSTOLIC BLOOD PRESSURE: 112 MMHG

## 2018-02-05 DIAGNOSIS — Z00.00 WELL WOMAN EXAM (NO GYNECOLOGICAL EXAM): Primary | ICD-10-CM

## 2018-02-05 DIAGNOSIS — Z12.11 SCREEN FOR COLON CANCER: ICD-10-CM

## 2018-02-05 LAB
BILIRUB UR QL STRIP: NEGATIVE
GLUCOSE UR-MCNC: NEGATIVE MG/DL
KETONES P FAST UR STRIP-MCNC: NEGATIVE MG/DL
PH UR STRIP: 7 [PH] (ref 4.6–8)
PROT UR QL STRIP: NEGATIVE
SP GR UR STRIP: 1.01 (ref 1–1.03)
UA UROBILINOGEN AMB POC: ABNORMAL (ref 0.2–1)
URINALYSIS CLARITY POC: CLEAR
URINALYSIS COLOR POC: YELLOW
URINE BLOOD POC: ABNORMAL
URINE LEUKOCYTES POC: NEGATIVE
URINE NITRITES POC: NEGATIVE

## 2018-02-05 NOTE — MR AVS SNAPSHOT
[address] [address] [address] [facility] 
[phone] Patient: Fatemeh Lazo MRN:  [id number] Visit Information Date & Time Provider Department Dept. Phone Encounter #  
 [id number]  0:60 PM Jocelyn Borrego [place] [address] [phone] [id number] Follow-up Instructions Return in about 1 year (around 2019). Upcoming Health Maintenance Date Due  
 PAP AKA CERVICAL CYTOLOGY 2020 BREAST CANCER SCRN MAMMOGRAM 2020 COLONOSCOPY 2023 DTaP/Tdap/Td series (3 - Td) 2027 Allergies as of 2018  Review Complete On: 990 By: Jocelyn Borrego MD  
  
 Severity Noted Reaction Type Reactions Biaxin [Clarithromycin] High 2011    Shortness of Breath, Swelling Ethanol (Ethyl Alcohol)  2011    Other (comments) Cannot digest  
 Tormentil  2011    Other (comments)  
 redness Current Immunizations  Reviewed on 2015 Name Date Tdap 2005 Not reviewed this visit You Were Diagnosed With   
  
 Codes Comments Well woman exam (no gynecological exam)    -  Primary ICD-10-CM: Z00.00 ICD-9-CM: V70.0 [V70.0] Screen for colon cancer     ICD-10-CM: Z12.11 ICD-9-CM: V76.51 Vitals BP Pulse Temp Resp Height(growth percentile) Weight(growth percentile) 112/70 (BP 1 Location: Right arm, BP Patient Position: Sitting) 82 98 °F (36.7 °C) (Oral) 18 5' 2\" (1.575 m) 133 lb 3.2 oz (60.4 kg) LMP SpO2 BMI OB Status Smoking Status 2015 98% 24.36 kg/m2 Postmenopausal Never Smoker BMI and BSA Data Body Mass Index Body Surface Area  
 24.36 kg/m 2 1.63 m 2 Preferred Pharmacy Pharmacy Name Phone [id number], [address] [place] [address] [phone] Your Updated Medication List  
  
   
 This list is accurate as of: 2/5/18  1:54 PM.  Always use your most recent med list.  
  
  
  
  
 calcium 500 mg Tab Take  by mouth. COSAMIN DS PO Take  by mouth daily. daily, 0 Refills FISH  mg Cap Generic drug:  Omega-3 Fatty Acids Take  by mouth. fluticasone 50 mcg/actuation nasal spray Commonly known as:  Torsten Black 2 Sprays by Both Nostrils route daily. folic acid 1 mg tablet Commonly known as:  Quita Take 1 mg by mouth daily. methotrexate 2.5 mg tablet Commonly known as:  Harvie Gitelman Take 25 mg by mouth every Sunday. MIRALAX 17 gram/dose powder Generic drug:  polyethylene glycol  
g, PO, daily, 0 Refills MOTRIN  mg tablet Generic drug:  ibuprofen Take 2 Tabs by mouth every six (6) hours as needed for Pain.  
  
 multivitamin tablet Commonly known as:  ONE A DAY Take 1 Tab by mouth daily. PROBIOTIC 4X 10-15 mg Tbec Generic drug:  B.infantis-B.ani-B.long-B.bifi Take  by mouth. VITAMIN D3 2,000 unit Tab Generic drug:  cholecalciferol (vitamin D3)  
4,000 Units. takes sometimes, 0 Refills We Performed the Following OCCULT BLOOD, IMMUNOASSAY (FIT) [id number] CPT(R)] Follow-up Instructions Return in about 1 year (around 2/5/2019). Patient Instructions Well Visit, Women 48 to 72: Care Instructions Your Care Instructions Physical exams can help you stay healthy. Your doctor has checked your overall health and may have suggested ways to take good care of yourself. He or she also may have recommended tests. At home, you can help prevent illness with healthy eating, regular exercise, and other steps. Follow-up care is a key part of your treatment and safety. Be sure to make and go to all appointments, and call your doctor if you are having problems. It's also a good idea to know your test results and keep a list of the medicines you take. How can you care for yourself at home? · Reach and stay at a healthy weight. This will lower your risk for many problems, such as obesity, diabetes, heart disease, and high blood pressure. · Get at least 30 minutes of exercise on most days of the week. Walking is a good choice. You also may want to do other activities, such as running, swimming, cycling, or playing tennis or team sports. · Do not smoke. Smoking can make health problems worse. If you need help quitting, talk to your doctor about stop-smoking programs and medicines. These can increase your chances of quitting for good. · Protect your skin from too much sun. When you're outdoors from 10 a.m. to 4 p.m., stay in the shade or cover up with clothing and a hat with a wide brim. Wear sunglasses that block UV rays. Even when it's cloudy, put broad-spectrum sunscreen (SPF 30 or higher) on any exposed skin. · See a dentist one or two times a year for checkups and to have your teeth cleaned. · Wear a seat belt in the car. · Limit alcohol to 1 drink a day. Too much alcohol can cause health problems. Follow your doctor's advice about when to have certain tests. These tests can spot problems early. · Cholesterol. Your doctor will tell you how often to have this done based on your age, family history, or other things that can increase your risk for heart attack and stroke. · Blood pressure. Have your blood pressure checked during a routine doctor visit. Your doctor will tell you how often to check your blood pressure based on your age, your blood pressure results, and other factors. · Mammogram. Ask your doctor how often you should have a mammogram, which is an X-ray of your breasts. A mammogram can spot breast cancer before it can be felt and when it is easiest to treat. · Pap test and pelvic exam. Ask your doctor how often you should have a Pap test. You may not need to have a Pap test as often as you used to. · Vision. Have your eyes checked every year or two or as often as your doctor suggests. Some experts recommend that you have yearly exams for glaucoma and other age-related eye problems starting at age 48. · Hearing. Tell your doctor if you notice any change in your hearing. You can have tests to find out how well you hear. · Diabetes. Ask your doctor whether you should have tests for diabetes. · Colon cancer. You should begin tests for colon cancer at age 48. You may have one of several tests. Your doctor will tell you how often to have tests based on your age and risk. Risks include whether you already had a precancerous polyp removed from your colon or whether your parents, sisters and brothers, or children have had colon cancer. · Thyroid disease. Talk to your doctor about whether to have your thyroid checked as part of a regular physical exam. Women have an increased chance of a thyroid problem. · Osteoporosis. You should begin tests for bone density at age 72. If you are younger than 72, ask your doctor whether you have factors that may increase your risk for this disease. You may want to have this test before age 72. · Heart attack and stroke risk. At least every 4 to 6 years, you should have your risk for heart attack and stroke assessed. Your doctor uses factors such as your age, blood pressure, cholesterol, and whether you smoke or have diabetes to show what your risk for a heart attack or stroke is over the next 10 years. When should you call for help? Watch closely for changes in your health, and be sure to contact your doctor if you have any problems or symptoms that concern you. Where can you learn more? Go to http://bell-thom.info/. Enter Q068 in the search box to learn more about \"Well Visit, Women 50 to 72: Care Instructions. \" Current as of: May 12, 2017 Content Version: 11.4 © 6509-1582 Healthwise, Incorporated.  Care instructions adapted under license by [address] (which disclaims liability or warranty for this information). If you have questions about a medical condition or this instruction, always ask your healthcare professional. [address] any warranty or liability for your use of this information. Introducing [facility]! [facility] introduces [employer] patient portal. Now you can access parts of your medical record, email your doctor's office, and request medication refills online. 1. In your internet browser, go to [email or web address] 2. Click on the First Time User? Click Here link in the Sign In box. You will see the New Member Sign Up page. 3. Enter your [employer] Access Code exactly as it appears below. You will not need to use this code after youve completed the sign-up process. If you do not sign up before the expiration date, you must request a new code. · [employer] Access Code: [email or web address] Expires: 5/6/2018  1:54 PM 
 
4. Enter the last four digits of your Social Security Number (xxxx) and Date of Birth (mm/dd/yyyy) as indicated and click Submit. You will be taken to the next sign-up page. 5. Create a [employer] ID. This will be your [employer] login ID and cannot be changed, so think of one that is secure and easy to remember. 6. Create a [employer] password. You can change your password at any time. 7. Enter your Password Reset Question and Answer. This can be used at a later time if you forget your password. 8. Enter your e-mail address. You will receive e-mail notification when new information is available in [address] 9. Click Sign Up. You can now view and download portions of your medical record. 10. Click the Download Summary menu link to download a portable copy of your medical information. If you have questions, please visit the Frequently Asked Questions section of the [employer] website.  Remember, [employer] is NOT to be used for urgent needs. For medical emergencies, dial 911. Now available from your iPhone and Android! Please provide this summary of care documentation to your next provider. Your primary care clinician is listed as Costa Petit. If you have any questions after today's visit, please call [phone].

## 2018-02-05 NOTE — PROGRESS NOTES
Identified pt with two pt identifiers(name and ). Chief Complaint   Patient presents with    Complete Physical     Requesting a complete physical check up        There are no preventive care reminders to display for this patient. Wt Readings from Last 3 Encounters:   10/25/17 134 lb (60.8 kg)   10/20/17 134 lb 3.2 oz (60.9 kg)   05/15/17 129 lb (58.5 kg)     Temp Readings from Last 3 Encounters:   10/25/17 97.6 °F (36.4 °C) (Oral)   10/20/17 97.8 °F (36.6 °C) (Oral)   05/15/17 98 °F (36.7 °C) (Oral)     BP Readings from Last 3 Encounters:   10/25/17 101/60   10/20/17 94/58   05/15/17 115/70     Pulse Readings from Last 3 Encounters:   10/25/17 75   10/20/17 80   05/15/17 78         Learning Assessment:  :     Learning Assessment 2/3/2014   PRIMARY LEARNER Patient   HIGHEST LEVEL OF EDUCATION - PRIMARY LEARNER  DID NOT GRADUATE HIGH SCHOOL   BARRIERS PRIMARY LEARNER NONE   CO-LEARNER CAREGIVER No   PRIMARY LANGUAGE OTHER (COMMENT)   LEARNER PREFERENCE PRIMARY LISTENING     DEMONSTRATION   ANSWERED BY SELF   RELATIONSHIP SELF       Depression Screening:  :     PHQ over the last two weeks 2018   Little interest or pleasure in doing things Not at all   Feeling down, depressed or hopeless Not at all   Total Score PHQ 2 0       Fall Risk Assessment:  :     Fall Risk Assessment, last 12 mths 10/20/2017   Able to walk? Yes   Fall in past 12 months? No       Abuse Screening:  :     Abuse Screening Questionnaire 10/20/2017 2017 3/16/2015   Do you ever feel afraid of your partner? N N N   Are you in a relationship with someone who physically or mentally threatens you? N N N   Is it safe for you to go home?  Yelitza Russell       Coordination of Care Questionnaire:  :     1) Have you been to an emergency room, urgent care clinic since your last visit? no   Hospitalized since your last visit? no             2) Have you seen or consulted any other health care providers outside of [facility] since your last visit? yes Rheumatologist   (Include any pap smears or colon screenings in this section.)    3) Do you have an Advance Directive on file? no  Are you interested in receiving information about Advance Directives? no.    Reviewed record in preparation for visit and have obtained necessary documentation. Medication reconciliation up to date and corrected with patient at this time.

## 2018-02-05 NOTE — LETTER
3/6/2018 10:04 AM 
 
Ms. Garcia [address] [place] [phone] Dear Danette Dougherty: 
 
Please find your most recent results below. Resulted Orders OCCULT BLOOD, IMMUNOASSAY (FIT) Result Value Ref Range Occult blood fecal, by IA Negative Negative Narrative Performed at:  [address]  [id number] : Frank Sherman MD, Phone:  [phone] AMB POC URINALYSIS DIP STICK AUTO W/O MICRO Result Value Ref Range Color (UA POC) Yellow Clarity (UA POC) Clear Glucose (UA POC) Negative Negative Bilirubin (UA POC) Negative Negative Ketones (UA POC) Negative Negative Specific gravity (UA POC) 1.010 1.001 - 1.035 Blood (UA POC) Trace Negative pH (UA POC) 7.0 4.6 - 8.0 Protein (UA POC) Negative Negative Urobilinogen (UA POC) 0.2 mg/dL 0.2 - 1 Nitrites (UA POC) Negative Negative Leukocyte esterase (UA POC) Negative Negative PLEASE NOTE Result Value Ref Range Please note Comment Comment:  
   The date and/or time of collection was not indicated on the 
requisition as required by state and federal law. The date of 
receipt of the specimen was used as the collection date if not 
supplied. Narrative Performed at:  [address]  [id number] : Frank Sherman MD, Phone:  [phone] RECOMMENDATIONS: 
Your recent stool test for occult blood has come back negative. Please call me if you have any questions: [phone] Sincerely, Panda Merchant MD

## 2018-02-05 NOTE — PATIENT INSTRUCTIONS
Well Visit, Women 48 to 72: Care Instructions  Your Care Instructions    Physical exams can help you stay healthy. Your doctor has checked your overall health and may have suggested ways to take good care of yourself. He or she also may have recommended tests. At home, you can help prevent illness with healthy eating, regular exercise, and other steps. Follow-up care is a key part of your treatment and safety. Be sure to make and go to all appointments, and call your doctor if you are having problems. It's also a good idea to know your test results and keep a list of the medicines you take. How can you care for yourself at home? · Reach and stay at a healthy weight. This will lower your risk for many problems, such as obesity, diabetes, heart disease, and high blood pressure. · Get at least 30 minutes of exercise on most days of the week. Walking is a good choice. You also may want to do other activities, such as running, swimming, cycling, or playing tennis or team sports. · Do not smoke. Smoking can make health problems worse. If you need help quitting, talk to your doctor about stop-smoking programs and medicines. These can increase your chances of quitting for good. · Protect your skin from too much sun. When you're outdoors from 10 a.m. to 4 p.m., stay in the shade or cover up with clothing and a hat with a wide brim. Wear sunglasses that block UV rays. Even when it's cloudy, put broad-spectrum sunscreen (SPF 30 or higher) on any exposed skin. · See a dentist one or two times a year for checkups and to have your teeth cleaned. · Wear a seat belt in the car. · Limit alcohol to 1 drink a day. Too much alcohol can cause health problems. Follow your doctor's advice about when to have certain tests. These tests can spot problems early. · Cholesterol.  Your doctor will tell you how often to have this done based on your age, family history, or other things that can increase your risk for heart attack and stroke. · Blood pressure. Have your blood pressure checked during a routine doctor visit. Your doctor will tell you how often to check your blood pressure based on your age, your blood pressure results, and other factors. · Mammogram. Ask your doctor how often you should have a mammogram, which is an X-ray of your breasts. A mammogram can spot breast cancer before it can be felt and when it is easiest to treat. · Pap test and pelvic exam. Ask your doctor how often you should have a Pap test. You may not need to have a Pap test as often as you used to. · Vision. Have your eyes checked every year or two or as often as your doctor suggests. Some experts recommend that you have yearly exams for glaucoma and other age-related eye problems starting at age 48. · Hearing. Tell your doctor if you notice any change in your hearing. You can have tests to find out how well you hear. · Diabetes. Ask your doctor whether you should have tests for diabetes. · Colon cancer. You should begin tests for colon cancer at age 48. You may have one of several tests. Your doctor will tell you how often to have tests based on your age and risk. Risks include whether you already had a precancerous polyp removed from your colon or whether your parents, sisters and brothers, or children have had colon cancer. · Thyroid disease. Talk to your doctor about whether to have your thyroid checked as part of a regular physical exam. Women have an increased chance of a thyroid problem. · Osteoporosis. You should begin tests for bone density at age 72. If you are younger than 72, ask your doctor whether you have factors that may increase your risk for this disease. You may want to have this test before age 72. · Heart attack and stroke risk. At least every 4 to 6 years, you should have your risk for heart attack and stroke assessed.  Your doctor uses factors such as your age, blood pressure, cholesterol, and whether you smoke or have diabetes to show what your risk for a heart attack or stroke is over the next 10 years. When should you call for help? Watch closely for changes in your health, and be sure to contact your doctor if you have any problems or symptoms that concern you. Where can you learn more? Go to http://bell-thom.info/. Enter R031 in the search box to learn more about \"Well Visit, Women 50 to 72: Care Instructions. \"  Current as of: May 12, 2017  Content Version: 11.4  © 6820-9871 Healthwise, Incorporated. Care instructions adapted under license by [employer] (which disclaims liability or warranty for this information). If you have questions about a medical condition or this instruction, always ask your healthcare professional. [address] any warranty or liability for your use of this information.

## 2018-02-05 NOTE — PROGRESS NOTES
Subjective:   46 y.o. female for Well Woman Check. Patient Active Problem List    Diagnosis Date Noted    Rheumatoid arthritis ([address] 12/07/2015    Carpal tunnel syndrome 12/07/2015    Gastroparesis 12/28/2011    GERD (gastroesophageal reflux disease) 06/30/2011     Current Outpatient Prescriptions   Medication Sig Dispense Refill    ibuprofen (MOTRIN IB) 200 mg tablet Take 2 Tabs by mouth every six (6) hours as needed for Pain. 40 Tab 0    multivitamin (ONE A DAY) tablet Take 1 Tab by mouth daily.  folic acid (FOLVITE) 1 mg tablet Take 1 mg by mouth daily. 11    methotrexate (RHEUMATREX) 2.5 mg tablet Take 25 mg by mouth every Sunday. 5    GLUCOSAMINE/CHONDRO SALAZAR A (COSAMIN DS PO) Take  by mouth daily. daily, 0 Refills      polyethylene glycol (MIRALAX) 17 gram/dose powder  g, PO, daily, 0 Refills      cholecalciferol, vitamin D3, (VITAMIN D3) 2,000 unit tab 4,000 Units. takes sometimes, 0 Refills      B.infantis-B.ani-B.long-B.bifi (PROBIOTIC 4X) 10-15 mg TbEC Take  by mouth.  FISH  mg cap Take  by mouth.  calcium 500 mg tab Take  by mouth.  fluticasone (FLONASE) 50 mcg/actuation nasal spray 2 Sprays by Both Nostrils route daily.        Allergies   Allergen Reactions    Biaxin [Clarithromycin] Shortness of Breath and Swelling    Ethanol (Ethyl Alcohol) Other (comments)     Cannot digest    Tormentil Other (comments)     redness     Past Medical History:   Diagnosis Date    Bursitis/tendonitis, shoulder     Gastroparesis     GERD (gastroesophageal reflux disease)     Rheumatoid arthritis (HCC)     Rheumatoid arthritis (HCC)      Past Surgical History:   Procedure Laterality Date    HX OTHER SURGICAL  2008    right knuckle surgery to remove janet thorn     Family History   Problem Relation Age of Onset    Hypertension Mother     Hypertension Sister    Grey Faizan Elevated Lipids Sister      Social History   Substance Use Topics    Smoking status: Never Smoker    Smokeless tobacco: Never Used    Alcohol use No             Specific concerns today: she F[facility] Racquel Rios for RA. Joints feel well on current MTX. She has problem with reading up close. Recent case of influenza. Review of Systems  Pertinent items are noted in HPI. Objective:   Blood pressure 112/70, pulse 82, temperature 98 °F (36.7 °C), temperature source Oral, resp. rate 18, height 5' 2\" (1.575 m), weight 133 lb 3.2 oz (60.4 kg), last menstrual period 02/12/2015, SpO2 98 %. Physical Examination:   General appearance - alert, well appearing, and in no distress  Mental status - alert, oriented to person, place, and time  Ears - bilateral TM's and external ear canals normal  Nose - normal and patent, no erythema, discharge or polyps  Mouth - mucous membranes moist, pharynx normal without lesions  Neck - supple, no significant adenopathy  Chest - clear to auscultation, no wheezes, rales or rhonchi, symmetric air entry  Heart - normal rate, regular rhythm, normal S1, S2, no murmurs, rubs, clicks or gallops  Abdomen - soft, nontender, nondistended, no masses or organomegaly  Breasts - breasts appear normal, no suspicious masses, no skin or nipple changes or axillary nodes  Musculoskeletal - no joint tenderness, deformity or swelling  Skin - normal coloration and turgor, no rashes, no suspicious skin lesions noted     Assessment/Plan:   Well woman exam.  lose weight, increase physical activity, follow low fat diet, follow low salt diet, routine labs ordered    ICD-10-CM ICD-9-CM    1. Well woman exam (no gynecological exam) Z00.00 V70.0     [V70.0]   2. Screen for colon cancer Z12.11 V76.51 OCCULT BLOOD, IMMUNOASSAY (FIT)     Patient Instructions        Well Visit, Women 48 to 72: Care Instructions  Your Care Instructions    Physical exams can help you stay healthy. Your doctor has checked your overall health and may have suggested ways to take good care of yourself.  He or she also may have recommended tests. At home, you can help prevent illness with healthy eating, regular exercise, and other steps. Follow-up care is a key part of your treatment and safety. Be sure to make and go to all appointments, and call your doctor if you are having problems. It's also a good idea to know your test results and keep a list of the medicines you take. How can you care for yourself at home? · Reach and stay at a healthy weight. This will lower your risk for many problems, such as obesity, diabetes, heart disease, and high blood pressure. · Get at least 30 minutes of exercise on most days of the week. Walking is a good choice. You also may want to do other activities, such as running, swimming, cycling, or playing tennis or team sports. · Do not smoke. Smoking can make health problems worse. If you need help quitting, talk to your doctor about stop-smoking programs and medicines. These can increase your chances of quitting for good. · Protect your skin from too much sun. When you're outdoors from 10 a.m. to 4 p.m., stay in the shade or cover up with clothing and a hat with a wide brim. Wear sunglasses that block UV rays. Even when it's cloudy, put broad-spectrum sunscreen (SPF 30 or higher) on any exposed skin. · See a dentist one or two times a year for checkups and to have your teeth cleaned. · Wear a seat belt in the car. · Limit alcohol to 1 drink a day. Too much alcohol can cause health problems. Follow your doctor's advice about when to have certain tests. These tests can spot problems early. · Cholesterol. Your doctor will tell you how often to have this done based on your age, family history, or other things that can increase your risk for heart attack and stroke. · Blood pressure. Have your blood pressure checked during a routine doctor visit. Your doctor will tell you how often to check your blood pressure based on your age, your blood pressure results, and other factors.   · Mammogram. Ask your doctor how often you should have a mammogram, which is an X-ray of your breasts. A mammogram can spot breast cancer before it can be felt and when it is easiest to treat. · Pap test and pelvic exam. Ask your doctor how often you should have a Pap test. You may not need to have a Pap test as often as you used to. · Vision. Have your eyes checked every year or two or as often as your doctor suggests. Some experts recommend that you have yearly exams for glaucoma and other age-related eye problems starting at age 48. · Hearing. Tell your doctor if you notice any change in your hearing. You can have tests to find out how well you hear. · Diabetes. Ask your doctor whether you should have tests for diabetes. · Colon cancer. You should begin tests for colon cancer at age 48. You may have one of several tests. Your doctor will tell you how often to have tests based on your age and risk. Risks include whether you already had a precancerous polyp removed from your colon or whether your parents, sisters and brothers, or children have had colon cancer. · Thyroid disease. Talk to your doctor about whether to have your thyroid checked as part of a regular physical exam. Women have an increased chance of a thyroid problem. · Osteoporosis. You should begin tests for bone density at age 72. If you are younger than 72, ask your doctor whether you have factors that may increase your risk for this disease. You may want to have this test before age 72. · Heart attack and stroke risk. At least every 4 to 6 years, you should have your risk for heart attack and stroke assessed. Your doctor uses factors such as your age, blood pressure, cholesterol, and whether you smoke or have diabetes to show what your risk for a heart attack or stroke is over the next 10 years. When should you call for help?   Watch closely for changes in your health, and be sure to contact your doctor if you have any problems or symptoms that concern you.  Where can you learn more? Go to http://bell-thom.info/. Enter X303 in the search box to learn more about \"Well Visit, Women 50 to 72: Care Instructions. \"  Current as of: May 12, 2017  Content Version: 11.4  © 8008-0927 Healthwise, [employer]. Care instructions adapted under license by [employer] (which disclaims liability or warranty for this information). If you have questions about a medical condition or this instruction, always ask your healthcare professional. [place] [address] any warranty or liability for your use of this information. Labs drawn.

## 2018-02-06 LAB
25(OH)D3+25(OH)D2 SERPL-MCNC: 53.9 NG/ML (ref 30–100)
ALBUMIN SERPL-MCNC: 4.8 G/DL (ref 3.5–5.5)
ALBUMIN/GLOB SERPL: 1.7 {RATIO} (ref 1.2–2.2)
ALP SERPL-CCNC: 75 IU/L (ref 39–117)
ALT SERPL-CCNC: 24 IU/L (ref 0–32)
AST SERPL-CCNC: 28 IU/L (ref 0–40)
BASOPHILS # BLD AUTO: 0 X10E3/UL (ref 0–0.2)
BASOPHILS NFR BLD AUTO: 0 %
BILIRUB SERPL-MCNC: 0.5 MG/DL (ref 0–1.2)
BUN SERPL-MCNC: 9 MG/DL (ref 6–24)
BUN/CREAT SERPL: 16 (ref 9–23)
CALCIUM SERPL-MCNC: 9.6 MG/DL (ref 8.7–10.2)
CHLORIDE SERPL-SCNC: 97 MMOL/L (ref 96–106)
CHOLEST SERPL-MCNC: 210 MG/DL (ref 100–199)
CO2 SERPL-SCNC: 24 MMOL/L (ref 18–29)
CREAT SERPL-MCNC: 0.58 MG/DL (ref 0.57–1)
EOSINOPHIL # BLD AUTO: 0.1 X10E3/UL (ref 0–0.4)
EOSINOPHIL NFR BLD AUTO: 2 %
ERYTHROCYTE [DISTWIDTH] IN BLOOD BY AUTOMATED COUNT: 14.5 % (ref 12.3–15.4)
EST. AVERAGE GLUCOSE BLD GHB EST-MCNC: 114 MG/DL
GFR SERPLBLD CREATININE-BSD FMLA CKD-EPI: 106 ML/MIN/1.73
GFR SERPLBLD CREATININE-BSD FMLA CKD-EPI: 123 ML/MIN/1.73
GLOBULIN SER CALC-MCNC: 2.9 G/DL (ref 1.5–4.5)
GLUCOSE SERPL-MCNC: 76 MG/DL (ref 65–99)
HBA1C MFR BLD: 5.6 % (ref 4.8–5.6)
HCT VFR BLD AUTO: 37.3 % (ref 34–46.6)
HDLC SERPL-MCNC: 87 MG/DL
HGB BLD-MCNC: 12 G/DL (ref 11.1–15.9)
IMM GRANULOCYTES # BLD: 0 X10E3/UL (ref 0–0.1)
IMM GRANULOCYTES NFR BLD: 0 %
INTERPRETATION, 910389: NORMAL
LDLC SERPL CALC-MCNC: 112 MG/DL (ref 0–99)
LYMPHOCYTES # BLD AUTO: 1.5 X10E3/UL (ref 0.7–3.1)
LYMPHOCYTES NFR BLD AUTO: 27 %
MCH RBC QN AUTO: 27.3 PG (ref 26.6–33)
MCHC RBC AUTO-ENTMCNC: 32.2 G/DL (ref 31.5–35.7)
MCV RBC AUTO: 85 FL (ref 79–97)
MONOCYTES # BLD AUTO: 0.5 X10E3/UL (ref 0.1–0.9)
MONOCYTES NFR BLD AUTO: 9 %
NEUTROPHILS # BLD AUTO: 3.3 X10E3/UL (ref 1.4–7)
NEUTROPHILS NFR BLD AUTO: 62 %
PLATELET # BLD AUTO: 312 X10E3/UL (ref 150–379)
POTASSIUM SERPL-SCNC: 3.7 MMOL/L (ref 3.5–5.2)
PROT SERPL-MCNC: 7.7 G/DL (ref 6–8.5)
RBC # BLD AUTO: 4.4 X10E6/UL (ref 3.77–5.28)
SODIUM SERPL-SCNC: 140 MMOL/L (ref 134–144)
TRIGL SERPL-MCNC: 53 MG/DL (ref 0–149)
TSH SERPL DL<=0.005 MIU/L-ACNC: 1.32 UIU/ML (ref 0.45–4.5)
VLDLC SERPL CALC-MCNC: 11 MG/DL (ref 5–40)
WBC # BLD AUTO: 5.4 X10E3/UL (ref 3.4–10.8)

## 2018-02-06 NOTE — TELEPHONE ENCOUNTER
Lab results look great! Normal blood cell counts. Normal sugar, liver, and kidney tests. Normal thyroid level. Normal Vit D level. Improved cholesterol numbers.   Keep up the good work!!

## 2018-03-05 LAB
HEMOCCULT STL QL IA: NEGATIVE
PLEASE NOTE:, 188601: NORMAL

## 2019-02-11 ENCOUNTER — OFFICE VISIT (OUTPATIENT)
Dept: FAMILY MEDICINE CLINIC | Age: 54
End: 2019-02-11

## 2019-02-11 VITALS
RESPIRATION RATE: 18 BRPM | HEART RATE: 64 BPM | WEIGHT: 138 LBS | BODY MASS INDEX: 25.4 KG/M2 | HEIGHT: 62 IN | SYSTOLIC BLOOD PRESSURE: 100 MMHG | DIASTOLIC BLOOD PRESSURE: 70 MMHG | TEMPERATURE: 97.5 F | OXYGEN SATURATION: 100 %

## 2019-02-11 DIAGNOSIS — Z00.00 ROUTINE ADULT HEALTH MAINTENANCE: Primary | ICD-10-CM

## 2019-02-11 DIAGNOSIS — E55.9 VITAMIN D DEFICIENCY: ICD-10-CM

## 2019-02-11 PROBLEM — K90.41 GLUTEN INTOLERANCE: Status: ACTIVE | Noted: 2019-02-11

## 2019-02-11 LAB
BILIRUB UR QL STRIP: NEGATIVE
GLUCOSE UR-MCNC: NEGATIVE MG/DL
KETONES P FAST UR STRIP-MCNC: NEGATIVE MG/DL
PH UR STRIP: 7 [PH] (ref 4.6–8)
PROT UR QL STRIP: NEGATIVE
SP GR UR STRIP: 1.01 (ref 1–1.03)
UA UROBILINOGEN AMB POC: NORMAL (ref 0.2–1)
URINALYSIS CLARITY POC: CLEAR
URINALYSIS COLOR POC: COLORLESS
URINE BLOOD POC: NORMAL
URINE LEUKOCYTES POC: NEGATIVE
URINE NITRITES POC: NEGATIVE

## 2019-02-11 RX ORDER — ALBUTEROL SULFATE 90 UG/1
AEROSOL, METERED RESPIRATORY (INHALATION)
Refills: 0 | COMMUNITY
Start: 2019-01-18 | End: 2019-02-11

## 2019-02-11 NOTE — PROGRESS NOTES
Identified pt with two pt identifiers(name and ). Chief Complaint Patient presents with  Physical  
  
 
Health Maintenance Due Topic  Shingrix Vaccine Age 50> (1 of 2) Wt Readings from Last 3 Encounters:  
19 138 lb (62.6 kg) 18 133 lb 3.2 oz (60.4 kg) 10/25/17 134 lb (60.8 kg) Temp Readings from Last 3 Encounters:  
19 97.5 °F (36.4 °C) (Oral) 18 98 °F (36.7 °C) (Oral) 10/25/17 97.6 °F (36.4 °C) (Oral) BP Readings from Last 3 Encounters:  
19 100/70  
18 112/70  
10/25/17 101/60 Pulse Readings from Last 3 Encounters:  
19 64  
18 82  
10/25/17 75 Learning Assessment: 
:  
 
Learning Assessment 2/3/2014 PRIMARY LEARNER Patient HIGHEST LEVEL OF EDUCATION - PRIMARY LEARNER  DID NOT GRADUATE HIGH SCHOOL  
BARRIERS PRIMARY LEARNER NONE  
CO-LEARNER CAREGIVER No  
PRIMARY LANGUAGE OTHER (COMMENT) LEARNER PREFERENCE PRIMARY LISTENING  
  DEMONSTRATION  
ANSWERED BY SELF  
RELATIONSHIP SELF Depression Screening: 
:  
 
PHQ over the last two weeks 2019 Little interest or pleasure in doing things Not at all Feeling down, depressed, irritable, or hopeless Not at all Total Score PHQ 2 0 Fall Risk Assessment: 
:  
 
Fall Risk Assessment, last 12 mths 10/20/2017 Able to walk? Yes Fall in past 12 months? No  
 
 
Abuse Screening: 
:  
 
Abuse Screening Questionnaire 2019 10/20/2017 2017 3/16/2015 Do you ever feel afraid of your partner? N N N N Are you in a relationship with someone who physically or mentally threatens you? N N N N Is it safe for you to go home? Macie Pantoja Coordination of Care Questionnaire: 
:  
 
1) Have you been to an emergency room, urgent care clinic since your last visit? yes [facility], week later URI Hospitalized since your last visit? no          
 
2) Have you seen or consulted any other health care providers outside of [address] [place] [address] since your last visit? Yes Dr Windy Martinez  (Include any pap smears or colon screenings in this section.) 3) Do you have an Advance Directive on file? no 
Are you interested in receiving information about Advance Directives? no 
 
Patient is accompanied by spouse I have received verbal consent from Kirstin Zamora to discuss any/all medical information while they are present in the room. Reviewed record in preparation for visit and have obtained necessary documentation. Medication reconciliation up to date and corrected with patient at this time. Results for orders placed or performed in visit on 02/11/19 AMB POC URINALYSIS DIP STICK AUTO W/O MICRO Result Value Ref Range Color (UA POC) Colorless Clarity (UA POC) Clear Glucose (UA POC) Negative Negative Bilirubin (UA POC) Negative Negative Ketones (UA POC) Negative Negative Specific gravity (UA POC) 1.010 1.001 - 1.035 Blood (UA POC) Trace Negative pH (UA POC) 7.0 4.6 - 8.0 Protein (UA POC) Negative Negative Urobilinogen (UA POC) 0.2 mg/dL 0.2 - 1 Nitrites (UA POC) Negative Negative Leukocyte esterase (UA POC) Negative Negative

## 2019-02-11 NOTE — PATIENT INSTRUCTIONS
Recombinant Zoster (Shingles) Vaccine, RZV: What you need to know Why get vaccinated? Shingles (also called herpes zoster, or just zoster) is a painful skin rash, often with blisters. Shingles is caused by the varicella zoster virus, the same virus that causes chickenpox. After you have chickenpox, the virus stays in your body and can cause shingles later in life. You can't catch shingles from another person. However, a person who has never had chickenpox (or chickenpox vaccine) could get chickenpox from someone with shingles. A shingles rash usually appears on one side of the face or body and heals within 2 to 4 weeks. Its main symptom is pain, which can be severe. Other symptoms can include fever, headache, chills, and upset stomach. Very rarely, a shingles infection can lead to pneumonia, hearing problems, blindness, brain inflammation (encephalitis), or death. For about 1 person in 5, severe pain can continue even long after the rash has cleared up. This long-lasting pain is called post-herpetic neuralgia (PHN). Shingles is far more common in people 48years of age and older than in younger people, and the risk increases with age. It is also more common in people whose immune system is weakened because of a disease such as cancer, or by drugs such as steroids or chemotherapy. At least 1 million people a year in the [facility] get shingles. Shingles vaccine (recombinant) Recombinant shingles vaccine was approved by FDA in 2017 for the prevention of shingles. In clinical trials, it was more than 90% effective in preventing shingles. It can also reduce the likelihood of PHN. Two doses, 2 to 6 months apart, are recommended for adults 48 and older. This vaccine is also recommended for people who have already gotten the live shingles vaccine (Zostavax). There is no live virus in this vaccine. Some people should not get this vaccine Tell your vaccine provider if you: · Have any severe, life-threatening allergies. A person who has ever had a life-threatening allergic reaction after a dose of recombinant shingles vaccine, or has a severe allergy to any component of this vaccine, may be advised not to be vaccinated. Ask your health care provider if you want information about vaccine components. · Are pregnant or breastfeeding. There is not much information about use of recombinant shingles vaccine in pregnant or nursing women. Your healthcare provider might recommend delaying vaccination. · Are not feeling well. If you have a mild illness, such as a cold, you can probably get the vaccine today. If you are moderately or severely ill, you should probably wait until you recover. Your doctor can advise you. Risks of a vaccine reaction With any medicine, including vaccines, there is a chance of reactions. After recombinant shingles vaccination, a person might experience: 
· Pain, redness, soreness, or swelling at the site of the injection · Headache, muscle aches, fever, shivering, fatigue In clinical trials, most people got a sore arm with mild or moderate pain after vaccination, and some also had redness and swelling where they got the shot. Some people felt tired, had muscle pain, a headache, shivering, fever, stomach pain, or nausea. About 1 out of 6 people who got recombinant zoster vaccine experienced side effects that prevented them from doing regular activities. Symptoms went away on their own in about 2 to 3 days. Side effects were more common in younger people. You should still get the second dose of recombinant zoster vaccine even if you had one of these reactions after the first dose. Other things that could happen after this vaccine: · People sometimes faint after medical procedures, including vaccination. Sitting or lying down for about 15 minutes can help prevent fainting and injuries caused by a fall.  Tell your provider if you feel dizzy or have vision changes or ringing in the ears. · Some people get shoulder pain that can be more severe and longer-lasting than routine soreness that can follow injections. This happens very rarely. · Any medication can cause a severe allergic reaction. Such reactions to a vaccine are estimated at about 1 in a million doses, and would happen within a few minutes to a few hours after the vaccination. As with any medicine, there is a very remote chance of a vaccine causing a serious injury or death. The safety of vaccines is always being monitored. For more information, visit: www.cdc.gov/vaccinesafety/ What if there is a serious problem? What should I look for? · Look for anything that concerns you, such as signs of a severe allergic reaction, very high fever, or unusual behavior. Signs of a severe allergic reaction can include hives, swelling of the face and throat, difficulty breathing, a fast heartbeat, dizziness, and weakness. These would usually start a few minutes to a few hours after the vaccination. What should I do? · If you think it is a severe allergic reaction or other emergency that can't wait, call 9-1-1 or get to the nearest hospital. Otherwise, call your health care provider. · Afterward, the reaction should be reported to the Vaccine Adverse Event Reporting System (VAERS). Your doctor should file this report, or you can do it yourself through the VAERS website at www.vaers. hhs.gov, or by calling 9-923.457.8581. VAERS does not give medical advice. Costa Torres How can I learn more? · Ask your health care provider. He or she can give you the vaccine package insert or suggest other sources of information. · Call your local or state health department. · Contact the Centers for Disease Control and Prevention (CDC): 
? Call 1-532.836.7576 (8-267-ZFW-INFO) or 
? Visit CDC's website at www.cdc.gov/vaccines Vaccine Information Statement (Interim) Recombinant Zoster Vaccine 2/12/2018 Department of Health and [employer] Centers for Disease Control and Prevention Many Vaccine Information Statements are available in [place] and other languages. See www.immunize.org/vis. Hojas de Información Sobre Vacunas están disponibles en Español y en muchos otros idiomas. Visite Ewa.si Care instructions adapted under license by [facility] (which disclaims liability or warranty for this information). If you have questions about a medical condition or this instruction, always ask your healthcare professional. [address] any warranty or liability for your use of this information.

## 2019-02-11 NOTE — PROGRESS NOTES
Subjective:  
48 y.o. female for Well Woman Check. Patient's last menstrual period was 02/12/2015. single partner, contraception - vasectomy. Pertinent past medical hstory: no history of HTN, DVT, CAD, DM, liver disease, migraines or smoking. Patient Active Problem List  
 Diagnosis Date Noted  [facility] 12/07/2015  Carpal tunnel syndrome 12/07/2015  Gastroparesis 12/28/2011  GERD (gastroesophageal reflux disease) 06/30/2011 Current Outpatient Medications Medication Sig Dispense Refill  multivitamin (ONE A DAY) tablet Take 1 Tab by mouth daily.  folic acid (FOLVITE) 1 mg tablet Take 1 mg by mouth daily. 11  
 methotrexate (RHEUMATREX) 2.5 mg tablet Take 25 mg by mouth every Sunday. 5  
 GLUCOSAMINE/CHONDRO SALAZAR A (COSAMIN DS PO) Take  by mouth daily. daily, 0 Refills  cholecalciferol, vitamin D3, (VITAMIN D3) 2,000 unit tab 4,000 Units. takes sometimes, 0 Refills  B.infantis-B.ani-B.long-B.bifi (PROBIOTIC 4X) 10-15 mg TbEC Take  by mouth.  FISH  mg cap Take  by mouth.  calcium 500 mg tab Take  by mouth.  polyethylene glycol (MIRALAX) 17 gram/dose powder  g, PO, daily, 0 Refills Allergies Allergen Reactions  Biaxin [Clarithromycin] Shortness of Breath and Swelling  Ethanol (Ethyl Alcohol) Other (comments) Cannot digest  
 Tormentil Other (comments)  
  redness Past Medical History:  
Diagnosis Date  Bursitis/tendonitis, shoulder  Gastroparesis  GERD (gastroesophageal reflux disease)  [facility] ([facility]  Rheumatoid arthritis ([facility] Past Surgical History:  
Procedure Laterality Date  HX OTHER SURGICAL  2008  
 right knuckle surgery to remove janet thorn Family History Problem Relation Age of Onset  Hypertension Mother  Hypertension Sister  Elevated Lipids Sister Social History Tobacco Use  Smoking status: Never Smoker  Smokeless tobacco: Never Used Substance Use Topics  Alcohol use: No  
  
 
ROS:  Feeling well. No dyspnea or chest pain on exertion. No abdominal pain, change in bowel habits, black or bloody stools. No urinary tract symptoms. GYN ROS: . No neurological complaints. She follow with [facility] for RA. On MTX. Cont to work at [employer] and helps care for 3 grand daughters. Also intolerant to gluten. Objective:  
 
Visit Vitals /70 (BP 1 Location: Right arm, BP Patient Position: Sitting) Comment: manual  
Pulse 64 Temp 97.5 °F (36.4 °C) (Oral) Resp 18 Ht 5' 2\" (1.575 m) Wt 138 lb (62.6 kg) LMP 02/12/2015 SpO2 100% BMI 25.24 kg/m² The patient appears well, alert, oriented x 3, in no distress. ENT normal.  Neck supple. No adenopathy or thyromegaly. SHIRLEY. Lungs are clear, good air entry, no wheezes, rhonchi or rales. S1 and S2 normal, no murmurs, regular rate and rhythm. Abdomen soft without tenderness, guarding, mass or organomegaly. Extremities show no edema, normal peripheral pulses. Neurological is normal, no focal findings. Assessment/Plan:  
well woman 
mammogram 
additional lab tests per orders 
return annually or prn 
  ICD-10-CM ICD-9-CM 1. Routine adult health maintenance Z00.00 V70.0 CBC WITH AUTOMATED DIFF  
   METABOLIC PANEL, COMPREHENSIVE  
   LIPID PANEL  
   TSH 3RD GENERATION  
   HEMOGLOBIN A1C WITH EAG  
   AMB POC URINALYSIS DIP STICK AUTO W/O MICRO 2. Vitamin D deficiency E55.9 268.9 VITAMIN D, 25 HYDROXY Bran Edmond Declined FLU vaccine. Discuss Shingrix vaccine. Info given.

## 2019-02-12 ENCOUNTER — HOSPITAL ENCOUNTER (OUTPATIENT)
Dept: MAMMOGRAPHY | Age: 54
Discharge: HOME | End: 2019-02-12
Attending: FAMILY MEDICINE
Payer: COMMERCIAL

## 2019-02-12 DIAGNOSIS — Z12.39 BREAST SCREENING: ICD-10-CM

## 2019-02-12 LAB
25(OH)D3+25(OH)D2 SERPL-MCNC: 49.2 NG/ML (ref 30–100)
ALBUMIN SERPL-MCNC: 4.6 G/DL (ref 3.5–5.5)
ALBUMIN/GLOB SERPL: 1.6 {RATIO} (ref 1.2–2.2)
ALP SERPL-CCNC: 67 IU/L (ref 39–117)
ALT SERPL-CCNC: 18 IU/L (ref 0–32)
AST SERPL-CCNC: 21 IU/L (ref 0–40)
BASOPHILS # BLD AUTO: 0 X10E3/UL (ref 0–0.2)
BASOPHILS NFR BLD AUTO: 0 %
BILIRUB SERPL-MCNC: 0.4 MG/DL (ref 0–1.2)
BUN SERPL-MCNC: 8 MG/DL (ref 6–24)
BUN/CREAT SERPL: 17 (ref 9–23)
CALCIUM SERPL-MCNC: 9.3 MG/DL (ref 8.7–10.2)
CHLORIDE SERPL-SCNC: 103 MMOL/L (ref 96–106)
CHOLEST SERPL-MCNC: 192 MG/DL (ref 100–199)
CO2 SERPL-SCNC: 24 MMOL/L (ref 20–29)
CREAT SERPL-MCNC: 0.48 MG/DL (ref 0.57–1)
EOSINOPHIL # BLD AUTO: 0.1 X10E3/UL (ref 0–0.4)
EOSINOPHIL NFR BLD AUTO: 2 %
ERYTHROCYTE [DISTWIDTH] IN BLOOD BY AUTOMATED COUNT: 14.8 % (ref 12.3–15.4)
EST. AVERAGE GLUCOSE BLD GHB EST-MCNC: 108 MG/DL
GLOBULIN SER CALC-MCNC: 2.9 G/DL (ref 1.5–4.5)
GLUCOSE SERPL-MCNC: 79 MG/DL (ref 65–99)
HBA1C MFR BLD: 5.4 % (ref 4.8–5.6)
HCT VFR BLD AUTO: 36.8 % (ref 34–46.6)
HDLC SERPL-MCNC: 69 MG/DL
HGB BLD-MCNC: 11.7 G/DL (ref 11.1–15.9)
IMM GRANULOCYTES # BLD AUTO: 0 X10E3/UL (ref 0–0.1)
IMM GRANULOCYTES NFR BLD AUTO: 0 %
INTERPRETATION, 910389: NORMAL
LDLC SERPL CALC-MCNC: 106 MG/DL (ref 0–99)
LYMPHOCYTES # BLD AUTO: 1.4 X10E3/UL (ref 0.7–3.1)
LYMPHOCYTES NFR BLD AUTO: 21 %
MCH RBC QN AUTO: 27.5 PG (ref 26.6–33)
MCHC RBC AUTO-ENTMCNC: 31.8 G/DL (ref 31.5–35.7)
MCV RBC AUTO: 86 FL (ref 79–97)
MONOCYTES # BLD AUTO: 0.5 X10E3/UL (ref 0.1–0.9)
MONOCYTES NFR BLD AUTO: 7 %
NEUTROPHILS # BLD AUTO: 4.9 X10E3/UL (ref 1.4–7)
NEUTROPHILS NFR BLD AUTO: 70 %
PLATELET # BLD AUTO: 298 X10E3/UL (ref 150–379)
POTASSIUM SERPL-SCNC: 3.6 MMOL/L (ref 3.5–5.2)
PROT SERPL-MCNC: 7.5 G/DL (ref 6–8.5)
RBC # BLD AUTO: 4.26 X10E6/UL (ref 3.77–5.28)
SODIUM SERPL-SCNC: 140 MMOL/L (ref 134–144)
TRIGL SERPL-MCNC: 86 MG/DL (ref 0–149)
TSH SERPL DL<=0.005 MIU/L-ACNC: 1.88 UIU/ML (ref 0.45–4.5)
VLDLC SERPL CALC-MCNC: 17 MG/DL (ref 5–40)
WBC # BLD AUTO: 7 X10E3/UL (ref 3.4–10.8)

## 2019-02-12 PROCEDURE — 77067 SCR MAMMO BI INCL CAD: CPT

## 2019-02-18 NOTE — PROGRESS NOTES
Your labs look great! Normal blood cell counts. Normal sugar, liver, and kidney tests. Normal thyroid test. Good cholesterol numbers. Vit D is at desired level. Keep up the good work!

## 2020-01-09 ENCOUNTER — HOSPITAL ENCOUNTER (OUTPATIENT)
Dept: ULTRASOUND IMAGING | Age: 55
Discharge: HOME | End: 2020-01-09
Attending: OTOLARYNGOLOGY
Payer: COMMERCIAL

## 2020-01-09 DIAGNOSIS — Z00.8 OTHER SPECIFIED GENERAL MEDICAL EXAMINATION: ICD-10-CM

## 2020-01-09 DIAGNOSIS — R22.1 NECK MASS: ICD-10-CM

## 2020-01-09 DIAGNOSIS — M06.9 RHEUMATOID ARTHRITIS (HCC): ICD-10-CM

## 2020-01-09 DIAGNOSIS — Z78.9 NON-SMOKER: ICD-10-CM

## 2020-01-09 DIAGNOSIS — I88.9 CERVICAL ADENITIS: ICD-10-CM

## 2020-01-09 DIAGNOSIS — H69.80 ETD (EUSTACHIAN TUBE DYSFUNCTION): ICD-10-CM

## 2020-01-09 PROCEDURE — 76536 US EXAM OF HEAD AND NECK: CPT

## 2020-01-23 ENCOUNTER — HOSPITAL ENCOUNTER (OUTPATIENT)
Dept: ULTRASOUND IMAGING | Age: 55
Discharge: HOME | End: 2020-01-23
Attending: OTOLARYNGOLOGY
Payer: COMMERCIAL

## 2020-01-23 DIAGNOSIS — R22.1 NECK MASS: ICD-10-CM

## 2020-01-23 PROCEDURE — 88172 CYTP DX EVAL FNA 1ST EA SITE: CPT

## 2020-01-23 PROCEDURE — 88173 CYTOPATH EVAL FNA REPORT: CPT

## 2020-01-23 PROCEDURE — 10005 FNA BX W/US GDN 1ST LES: CPT

## 2020-01-23 RX ORDER — LIDOCAINE HYDROCHLORIDE 10 MG/ML
10 INJECTION, SOLUTION EPIDURAL; INFILTRATION; INTRACAUDAL; PERINEURAL
Status: COMPLETED | OUTPATIENT
Start: 2020-01-23 | End: 2020-01-23

## 2020-01-23 RX ADMIN — LIDOCAINE HYDROCHLORIDE 5 ML: 10 INJECTION, SOLUTION EPIDURAL; INFILTRATION; INTRACAUDAL; PERINEURAL at 13:43

## 2020-01-28 ENCOUNTER — TELEPHONE (OUTPATIENT)
Dept: FAMILY MEDICINE CLINIC | Age: 55
End: 2020-01-28

## 2020-01-28 NOTE — TELEPHONE ENCOUNTER
----- Message from Brianda Verduzco sent at 1/27/2020  5:04 PM EST -----  Regarding: Dr Magali Thomas  General Message/Vendor Calls    Caller's first and last name:  Carolin Nevarez,pt's     Reason for call: said his wife had a mass on her neck and she had a needle bx and now her   ENT wants her to do a open bx to remove the nerve and he would like to get a 2nd opinion with Dr Stephan Torres required yes/no and why:  Yes for reason given above    Best contact number(s): [phone]      Details to clarify the request:  They will  be going to discuss the procedure tomorrow around 12:30pm and would like to speak with Dr Rishi Carson before then    Brianda Verduzco

## 2020-01-28 NOTE — TELEPHONE ENCOUNTER
I spoke with [place]. Needle biopsy done last week but recommend open bx of LN to rule out neoplastic process. I recommend they go ahead to schedule open bx .

## 2020-02-05 ENCOUNTER — HOSPITAL ENCOUNTER (OUTPATIENT)
Dept: CT IMAGING | Age: 55
Discharge: HOME | End: 2020-02-05
Attending: OTOLARYNGOLOGY
Payer: COMMERCIAL

## 2020-02-05 DIAGNOSIS — D49.89 NEOPLASM OF NECK: ICD-10-CM

## 2020-02-05 PROCEDURE — 74011636320 HC RX REV CODE- 636/320: Performed by: RADIOLOGY

## 2020-02-05 PROCEDURE — 74011000258 HC RX REV CODE- 258: Performed by: RADIOLOGY

## 2020-02-05 PROCEDURE — 70491 CT SOFT TISSUE NECK W/DYE: CPT

## 2020-02-05 RX ORDER — SODIUM CHLORIDE 0.9 % (FLUSH) 0.9 %
10 SYRINGE (ML) INJECTION
Status: COMPLETED | OUTPATIENT
Start: 2020-02-05 | End: 2020-02-05

## 2020-02-05 RX ADMIN — Medication 10 ML: at 19:06

## 2020-02-05 RX ADMIN — IOPAMIDOL 100 ML: 612 INJECTION, SOLUTION INTRAVENOUS at 19:06

## 2020-02-05 RX ADMIN — SODIUM CHLORIDE 55 ML: 900 INJECTION, SOLUTION INTRAVENOUS at 19:06

## 2020-02-11 ENCOUNTER — HOSPITAL ENCOUNTER (OUTPATIENT)
Dept: PREADMISSION TESTING | Age: 55
Discharge: HOME | End: 2020-02-11
Payer: COMMERCIAL

## 2020-02-11 VITALS
TEMPERATURE: 98.1 F | HEIGHT: 62 IN | SYSTOLIC BLOOD PRESSURE: 115 MMHG | WEIGHT: 133.38 LBS | DIASTOLIC BLOOD PRESSURE: 69 MMHG | RESPIRATION RATE: 14 BRPM | BODY MASS INDEX: 24.54 KG/M2

## 2020-02-11 LAB
BASOPHILS # BLD: 0 K/UL (ref 0–0.1)
BASOPHILS NFR BLD: 1 % (ref 0–1)
DIFFERENTIAL METHOD BLD: NORMAL
EOSINOPHIL # BLD: 0.1 K/UL (ref 0–0.4)
EOSINOPHIL NFR BLD: 1 % (ref 0–7)
ERYTHROCYTE [DISTWIDTH] IN BLOOD BY AUTOMATED COUNT: 13.6 % (ref 11.5–14.5)
HCT VFR BLD AUTO: 38.1 % (ref 35–47)
HGB BLD-MCNC: 12.2 G/DL (ref 11.5–16)
IMM GRANULOCYTES # BLD AUTO: 0 K/UL (ref 0–0.04)
IMM GRANULOCYTES NFR BLD AUTO: 0 % (ref 0–0.5)
LYMPHOCYTES # BLD: 1.3 K/UL (ref 0.8–3.5)
LYMPHOCYTES NFR BLD: 31 % (ref 12–49)
MCH RBC QN AUTO: 27.5 PG (ref 26–34)
MCHC RBC AUTO-ENTMCNC: 32 G/DL (ref 30–36.5)
MCV RBC AUTO: 86 FL (ref 80–99)
MONOCYTES # BLD: 0.4 K/UL (ref 0–1)
MONOCYTES NFR BLD: 10 % (ref 5–13)
NEUTS SEG # BLD: 2.5 K/UL (ref 1.8–8)
NEUTS SEG NFR BLD: 57 % (ref 32–75)
NRBC # BLD: 0 K/UL (ref 0–0.01)
NRBC BLD-RTO: 0 PER 100 WBC
PLATELET # BLD AUTO: 249 K/UL (ref 150–400)
PMV BLD AUTO: 11.1 FL (ref 8.9–12.9)
RBC # BLD AUTO: 4.43 M/UL (ref 3.8–5.2)
WBC # BLD AUTO: 4.4 K/UL (ref 3.6–11)

## 2020-02-11 PROCEDURE — 85025 COMPLETE CBC W/AUTO DIFF WBC: CPT

## 2020-02-11 RX ORDER — ASCORBIC ACID 500 MG
500 TABLET ORAL DAILY
COMMUNITY

## 2020-02-11 NOTE — PERIOP NOTES
Preoperative instructions reviewed with patient and her , Gerhardt Jacobson. Patient given SSI infection FAQS sheet. Given two bottles of skin prep chlorhexidine soap; given written and verbal instructions on use. Patient was given the opportunity to ask questions on the information provided. Surgical consent obtained and signed by patient.

## 2020-02-12 ENCOUNTER — ANESTHESIA EVENT (OUTPATIENT)
Dept: MEDSURG UNIT | Age: 55
End: 2020-02-12
Payer: COMMERCIAL

## 2020-02-12 NOTE — ANESTHESIA PREPROCEDURE EVALUATION
Relevant Problems   No relevant active problems       Anesthetic History   No history of anesthetic complications            Review of Systems / Medical History  Patient summary reviewed, nursing notes reviewed and pertinent labs reviewed    Pulmonary  Within defined limits                 Neuro/Psych   Within defined limits           Cardiovascular  Within defined limits                     GI/Hepatic/Renal     GERD           Endo/Other        Arthritis     Other Findings              Physical Exam    Airway  Mallampati: II  TM Distance: > 6 cm  Neck ROM: normal range of motion   Mouth opening: Normal     Cardiovascular  Regular rate and rhythm,  S1 and S2 normal,  no murmur, click, rub, or gallop             Dental  No notable dental hx       Pulmonary  Breath sounds clear to auscultation               Abdominal  GI exam deferred       Other Findings            Anesthetic Plan    ASA: 2  Anesthesia type: general          Induction: Intravenous  Anesthetic plan and risks discussed with: Patient

## 2020-02-13 ENCOUNTER — ANESTHESIA (OUTPATIENT)
Dept: MEDSURG UNIT | Age: 55
End: 2020-02-13
Payer: COMMERCIAL

## 2020-02-13 ENCOUNTER — HOSPITAL ENCOUNTER (OUTPATIENT)
Age: 55
Setting detail: OUTPATIENT SURGERY
Discharge: HOME | End: 2020-02-13
Attending: OTOLARYNGOLOGY | Admitting: OTOLARYNGOLOGY
Payer: COMMERCIAL

## 2020-02-13 VITALS
TEMPERATURE: 97.2 F | WEIGHT: 133 LBS | BODY MASS INDEX: 24.48 KG/M2 | HEIGHT: 62 IN | DIASTOLIC BLOOD PRESSURE: 74 MMHG | RESPIRATION RATE: 15 BRPM | OXYGEN SATURATION: 99 % | SYSTOLIC BLOOD PRESSURE: 109 MMHG | HEART RATE: 86 BPM

## 2020-02-13 DIAGNOSIS — L76.82 PAIN AT SURGICAL INCISION: Primary | ICD-10-CM

## 2020-02-13 PROCEDURE — 76030000003 HC AMB SURG OR TIME 1.5 TO 2: Performed by: OTOLARYNGOLOGY

## 2020-02-13 PROCEDURE — 76060000063 HC AMB SURG ANES 1.5 TO 2 HR: Performed by: OTOLARYNGOLOGY

## 2020-02-13 PROCEDURE — 77030019655 HC PRB STIM CRAN MEDT -B: Performed by: OTOLARYNGOLOGY

## 2020-02-13 PROCEDURE — 77030002916 HC SUT ETHLN J&J -A: Performed by: OTOLARYNGOLOGY

## 2020-02-13 PROCEDURE — 77030040922 HC BLNKT HYPOTHRM STRY -A

## 2020-02-13 PROCEDURE — 88342 IMHCHEM/IMCYTCHM 1ST ANTB: CPT

## 2020-02-13 PROCEDURE — 88341 IMHCHEM/IMCYTCHM EA ADD ANTB: CPT

## 2020-02-13 PROCEDURE — 74011250637 HC RX REV CODE- 250/637: Performed by: OTOLARYNGOLOGY

## 2020-02-13 PROCEDURE — 74011250636 HC RX REV CODE- 250/636: Performed by: NURSE ANESTHETIST, CERTIFIED REGISTERED

## 2020-02-13 PROCEDURE — 74011000250 HC RX REV CODE- 250: Performed by: NURSE ANESTHETIST, CERTIFIED REGISTERED

## 2020-02-13 PROCEDURE — 76210000046 HC AMBSU PH II REC FIRST 0.5 HR: Performed by: OTOLARYNGOLOGY

## 2020-02-13 PROCEDURE — 77030002974 HC SUT PLN J&J -A: Performed by: OTOLARYNGOLOGY

## 2020-02-13 PROCEDURE — 77030019908 HC STETH ESOPH SIMS -A: Performed by: ANESTHESIOLOGY

## 2020-02-13 PROCEDURE — 88305 TISSUE EXAM BY PATHOLOGIST: CPT

## 2020-02-13 PROCEDURE — 77030027714 HC DRN WND KT TLS STRY -B: Performed by: OTOLARYNGOLOGY

## 2020-02-13 PROCEDURE — 77030026438 HC STYL ET INTUB CARD -A: Performed by: ANESTHESIOLOGY

## 2020-02-13 PROCEDURE — 74011250637 HC RX REV CODE- 250/637: Performed by: ANESTHESIOLOGY

## 2020-02-13 PROCEDURE — 77030008684 HC TU ET CUF COVD -B: Performed by: ANESTHESIOLOGY

## 2020-02-13 PROCEDURE — 88365 INSITU HYBRIDIZATION (FISH): CPT

## 2020-02-13 PROCEDURE — 77030008462 HC STPLR SKN PROX J&J -A: Performed by: OTOLARYNGOLOGY

## 2020-02-13 PROCEDURE — 77030031139 HC SUT VCRL2 J&J -A: Performed by: OTOLARYNGOLOGY

## 2020-02-13 PROCEDURE — 74011250636 HC RX REV CODE- 250/636: Performed by: OTOLARYNGOLOGY

## 2020-02-13 PROCEDURE — 74011250636 HC RX REV CODE- 250/636: Performed by: ANESTHESIOLOGY

## 2020-02-13 PROCEDURE — 77030019615 HC ELCTRD EMG NDL MEDT -B: Performed by: OTOLARYNGOLOGY

## 2020-02-13 PROCEDURE — 74011000250 HC RX REV CODE- 250: Performed by: OTOLARYNGOLOGY

## 2020-02-13 PROCEDURE — 76210000036 HC AMBSU PH I REC 1.5 TO 2 HR: Performed by: OTOLARYNGOLOGY

## 2020-02-13 PROCEDURE — 77030040361 HC SLV COMPR DVT MDII -B: Performed by: OTOLARYNGOLOGY

## 2020-02-13 PROCEDURE — 77030003666 HC NDL SPINAL BD -A: Performed by: OTOLARYNGOLOGY

## 2020-02-13 RX ORDER — EPHEDRINE SULFATE/0.9% NACL/PF 50 MG/5 ML
SYRINGE (ML) INTRAVENOUS AS NEEDED
Status: DISCONTINUED | OUTPATIENT
Start: 2020-02-13 | End: 2020-02-13 | Stop reason: HOSPADM

## 2020-02-13 RX ORDER — CEPHALEXIN 500 MG/1
500 CAPSULE ORAL 2 TIMES DAILY
Qty: 20 CAP | Refills: 0 | Status: SHIPPED | OUTPATIENT
Start: 2020-02-13 | End: 2020-02-23

## 2020-02-13 RX ORDER — SODIUM CHLORIDE 9 MG/ML
25 INJECTION, SOLUTION INTRAVENOUS CONTINUOUS
Status: DISCONTINUED | OUTPATIENT
Start: 2020-02-13 | End: 2020-02-13 | Stop reason: HOSPADM

## 2020-02-13 RX ORDER — MIDAZOLAM HYDROCHLORIDE 1 MG/ML
INJECTION, SOLUTION INTRAMUSCULAR; INTRAVENOUS AS NEEDED
Status: DISCONTINUED | OUTPATIENT
Start: 2020-02-13 | End: 2020-02-13 | Stop reason: HOSPADM

## 2020-02-13 RX ORDER — FENTANYL CITRATE 50 UG/ML
25 INJECTION, SOLUTION INTRAMUSCULAR; INTRAVENOUS
Status: COMPLETED | OUTPATIENT
Start: 2020-02-13 | End: 2020-02-13

## 2020-02-13 RX ORDER — SILVER NITRATE 38.21; 12.74 MG/1; MG/1
STICK TOPICAL AS NEEDED
Status: DISCONTINUED | OUTPATIENT
Start: 2020-02-13 | End: 2020-02-13 | Stop reason: HOSPADM

## 2020-02-13 RX ORDER — ONDANSETRON 2 MG/ML
INJECTION INTRAMUSCULAR; INTRAVENOUS AS NEEDED
Status: DISCONTINUED | OUTPATIENT
Start: 2020-02-13 | End: 2020-02-13 | Stop reason: HOSPADM

## 2020-02-13 RX ORDER — ACETAMINOPHEN 325 MG/1
650 TABLET ORAL ONCE
Status: COMPLETED | OUTPATIENT
Start: 2020-02-13 | End: 2020-02-13

## 2020-02-13 RX ORDER — EPHEDRINE SULFATE/0.9% NACL/PF 50 MG/5 ML
5 SYRINGE (ML) INTRAVENOUS AS NEEDED
Status: DISCONTINUED | OUTPATIENT
Start: 2020-02-13 | End: 2020-02-13 | Stop reason: HOSPADM

## 2020-02-13 RX ORDER — ROPIVACAINE HYDROCHLORIDE 5 MG/ML
150 INJECTION, SOLUTION EPIDURAL; INFILTRATION; PERINEURAL AS NEEDED
Status: DISCONTINUED | OUTPATIENT
Start: 2020-02-13 | End: 2020-02-13 | Stop reason: HOSPADM

## 2020-02-13 RX ORDER — FENTANYL CITRATE 50 UG/ML
50 INJECTION, SOLUTION INTRAMUSCULAR; INTRAVENOUS AS NEEDED
Status: DISCONTINUED | OUTPATIENT
Start: 2020-02-13 | End: 2020-02-13 | Stop reason: HOSPADM

## 2020-02-13 RX ORDER — DEXAMETHASONE SODIUM PHOSPHATE 4 MG/ML
INJECTION, SOLUTION INTRA-ARTICULAR; INTRALESIONAL; INTRAMUSCULAR; INTRAVENOUS; SOFT TISSUE AS NEEDED
Status: DISCONTINUED | OUTPATIENT
Start: 2020-02-13 | End: 2020-02-13 | Stop reason: HOSPADM

## 2020-02-13 RX ORDER — PHENYLEPHRINE HCL IN 0.9% NACL 0.4MG/10ML
SYRINGE (ML) INTRAVENOUS AS NEEDED
Status: DISCONTINUED | OUTPATIENT
Start: 2020-02-13 | End: 2020-02-13 | Stop reason: HOSPADM

## 2020-02-13 RX ORDER — DIPHENHYDRAMINE HYDROCHLORIDE 50 MG/ML
12.5 INJECTION, SOLUTION INTRAMUSCULAR; INTRAVENOUS AS NEEDED
Status: DISCONTINUED | OUTPATIENT
Start: 2020-02-13 | End: 2020-02-13 | Stop reason: HOSPADM

## 2020-02-13 RX ORDER — ONDANSETRON 8 MG/1
8 TABLET, ORALLY DISINTEGRATING ORAL
Qty: 8 TAB | Refills: 0 | Status: SHIPPED | OUTPATIENT
Start: 2020-02-13 | End: 2020-02-18

## 2020-02-13 RX ORDER — LIDOCAINE HYDROCHLORIDE 10 MG/ML
0.1 INJECTION, SOLUTION EPIDURAL; INFILTRATION; INTRACAUDAL; PERINEURAL AS NEEDED
Status: DISCONTINUED | OUTPATIENT
Start: 2020-02-13 | End: 2020-02-13 | Stop reason: HOSPADM

## 2020-02-13 RX ORDER — MIDAZOLAM HYDROCHLORIDE 1 MG/ML
1 INJECTION, SOLUTION INTRAMUSCULAR; INTRAVENOUS AS NEEDED
Status: DISCONTINUED | OUTPATIENT
Start: 2020-02-13 | End: 2020-02-13 | Stop reason: HOSPADM

## 2020-02-13 RX ORDER — HYDROCODONE BITARTRATE AND ACETAMINOPHEN 7.5; 325 MG/1; MG/1
1-2 TABLET ORAL
Qty: 14 TAB | Refills: 0 | Status: SHIPPED | OUTPATIENT
Start: 2020-02-13 | End: 2020-02-18

## 2020-02-13 RX ORDER — SODIUM CHLORIDE, SODIUM LACTATE, POTASSIUM CHLORIDE, CALCIUM CHLORIDE 600; 310; 30; 20 MG/100ML; MG/100ML; MG/100ML; MG/100ML
1000 INJECTION, SOLUTION INTRAVENOUS CONTINUOUS
Status: DISCONTINUED | OUTPATIENT
Start: 2020-02-13 | End: 2020-02-13 | Stop reason: HOSPADM

## 2020-02-13 RX ORDER — LIDOCAINE HYDROCHLORIDE 20 MG/ML
INJECTION, SOLUTION EPIDURAL; INFILTRATION; INTRACAUDAL; PERINEURAL AS NEEDED
Status: DISCONTINUED | OUTPATIENT
Start: 2020-02-13 | End: 2020-02-13 | Stop reason: HOSPADM

## 2020-02-13 RX ORDER — MIDAZOLAM HYDROCHLORIDE 1 MG/ML
0.5 INJECTION, SOLUTION INTRAMUSCULAR; INTRAVENOUS
Status: DISCONTINUED | OUTPATIENT
Start: 2020-02-13 | End: 2020-02-13 | Stop reason: HOSPADM

## 2020-02-13 RX ORDER — OXYCODONE HYDROCHLORIDE 5 MG/1
5 TABLET ORAL AS NEEDED
Status: DISCONTINUED | OUTPATIENT
Start: 2020-02-13 | End: 2020-02-13 | Stop reason: HOSPADM

## 2020-02-13 RX ORDER — FENTANYL CITRATE 50 UG/ML
INJECTION, SOLUTION INTRAMUSCULAR; INTRAVENOUS AS NEEDED
Status: DISCONTINUED | OUTPATIENT
Start: 2020-02-13 | End: 2020-02-13 | Stop reason: HOSPADM

## 2020-02-13 RX ORDER — PROPOFOL 10 MG/ML
INJECTION, EMULSION INTRAVENOUS AS NEEDED
Status: DISCONTINUED | OUTPATIENT
Start: 2020-02-13 | End: 2020-02-13 | Stop reason: HOSPADM

## 2020-02-13 RX ORDER — LIDOCAINE HYDROCHLORIDE AND EPINEPHRINE 10; 10 MG/ML; UG/ML
INJECTION, SOLUTION INFILTRATION; PERINEURAL AS NEEDED
Status: DISCONTINUED | OUTPATIENT
Start: 2020-02-13 | End: 2020-02-13 | Stop reason: HOSPADM

## 2020-02-13 RX ORDER — SUCCINYLCHOLINE CHLORIDE 20 MG/ML
INJECTION INTRAMUSCULAR; INTRAVENOUS AS NEEDED
Status: DISCONTINUED | OUTPATIENT
Start: 2020-02-13 | End: 2020-02-13 | Stop reason: HOSPADM

## 2020-02-13 RX ORDER — OXYMETAZOLINE HCL 0.05 %
2 SPRAY, NON-AEROSOL (ML) NASAL
Status: DISCONTINUED | OUTPATIENT
Start: 2020-02-13 | End: 2020-02-13 | Stop reason: HOSPADM

## 2020-02-13 RX ORDER — MORPHINE SULFATE 10 MG/ML
2 INJECTION, SOLUTION INTRAMUSCULAR; INTRAVENOUS
Status: DISCONTINUED | OUTPATIENT
Start: 2020-02-13 | End: 2020-02-13 | Stop reason: HOSPADM

## 2020-02-13 RX ORDER — HYDROMORPHONE HYDROCHLORIDE 1 MG/ML
0.2 INJECTION, SOLUTION INTRAMUSCULAR; INTRAVENOUS; SUBCUTANEOUS
Status: ACTIVE | OUTPATIENT
Start: 2020-02-13 | End: 2020-02-13

## 2020-02-13 RX ORDER — CEFAZOLIN SODIUM/WATER 2 G/20 ML
2 SYRINGE (ML) INTRAVENOUS ONCE
Status: COMPLETED | OUTPATIENT
Start: 2020-02-13 | End: 2020-02-13

## 2020-02-13 RX ORDER — ONDANSETRON 2 MG/ML
4 INJECTION INTRAMUSCULAR; INTRAVENOUS AS NEEDED
Status: DISCONTINUED | OUTPATIENT
Start: 2020-02-13 | End: 2020-02-13 | Stop reason: HOSPADM

## 2020-02-13 RX ORDER — SODIUM CHLORIDE, SODIUM LACTATE, POTASSIUM CHLORIDE, CALCIUM CHLORIDE 600; 310; 30; 20 MG/100ML; MG/100ML; MG/100ML; MG/100ML
100 INJECTION, SOLUTION INTRAVENOUS CONTINUOUS
Status: DISCONTINUED | OUTPATIENT
Start: 2020-02-13 | End: 2020-02-13 | Stop reason: HOSPADM

## 2020-02-13 RX ORDER — EPINEPHRINE NASAL SOLUTION 1 MG/ML
SOLUTION NASAL AS NEEDED
Status: DISCONTINUED | OUTPATIENT
Start: 2020-02-13 | End: 2020-02-13 | Stop reason: HOSPADM

## 2020-02-13 RX ORDER — ROCURONIUM BROMIDE 10 MG/ML
INJECTION, SOLUTION INTRAVENOUS AS NEEDED
Status: DISCONTINUED | OUTPATIENT
Start: 2020-02-13 | End: 2020-02-13 | Stop reason: HOSPADM

## 2020-02-13 RX ADMIN — OXYMETAZOLINE HYDROCHLORIDE 2 SPRAY: 0.05 SPRAY NASAL at 07:15

## 2020-02-13 RX ADMIN — DEXAMETHASONE SODIUM PHOSPHATE 4 MG: 4 INJECTION, SOLUTION INTRAMUSCULAR; INTRAVENOUS at 07:45

## 2020-02-13 RX ADMIN — SODIUM CHLORIDE, SODIUM LACTATE, POTASSIUM CHLORIDE, AND CALCIUM CHLORIDE 1000 ML: 600; 310; 30; 20 INJECTION, SOLUTION INTRAVENOUS at 07:02

## 2020-02-13 RX ADMIN — ACETAMINOPHEN 650 MG: 325 TABLET ORAL at 07:02

## 2020-02-13 RX ADMIN — Medication 5 MG: at 08:20

## 2020-02-13 RX ADMIN — Medication 80 MCG: at 07:42

## 2020-02-13 RX ADMIN — FENTANYL CITRATE 25 MCG: 50 INJECTION INTRAMUSCULAR; INTRAVENOUS at 09:44

## 2020-02-13 RX ADMIN — FENTANYL CITRATE 25 MCG: 50 INJECTION INTRAMUSCULAR; INTRAVENOUS at 10:10

## 2020-02-13 RX ADMIN — FENTANYL CITRATE 50 MCG: 50 INJECTION, SOLUTION INTRAMUSCULAR; INTRAVENOUS at 07:38

## 2020-02-13 RX ADMIN — FENTANYL CITRATE 25 MCG: 50 INJECTION INTRAMUSCULAR; INTRAVENOUS at 09:50

## 2020-02-13 RX ADMIN — ONDANSETRON HYDROCHLORIDE 4 MG: 2 INJECTION, SOLUTION INTRAVENOUS at 09:00

## 2020-02-13 RX ADMIN — Medication 2 G: at 07:45

## 2020-02-13 RX ADMIN — PROPOFOL 120 MG: 10 INJECTION, EMULSION INTRAVENOUS at 07:38

## 2020-02-13 RX ADMIN — FENTANYL CITRATE 25 MCG: 50 INJECTION INTRAMUSCULAR; INTRAVENOUS at 10:02

## 2020-02-13 RX ADMIN — Medication 80 MCG: at 07:48

## 2020-02-13 RX ADMIN — SUCCINYLCHOLINE CHLORIDE 100 MG: 20 INJECTION, SOLUTION INTRAMUSCULAR; INTRAVENOUS at 07:38

## 2020-02-13 RX ADMIN — PHENYLEPHRINE HYDROCHLORIDE 40 MCG/MIN: 10 INJECTION INTRAVENOUS at 08:00

## 2020-02-13 RX ADMIN — PROPOFOL 50 MG: 10 INJECTION, EMULSION INTRAVENOUS at 07:45

## 2020-02-13 RX ADMIN — Medication 120 MCG: at 07:45

## 2020-02-13 RX ADMIN — MIDAZOLAM 2 MG: 1 INJECTION INTRAMUSCULAR; INTRAVENOUS at 07:27

## 2020-02-13 RX ADMIN — ROCURONIUM BROMIDE 5 MG: 10 SOLUTION INTRAVENOUS at 07:38

## 2020-02-13 RX ADMIN — LIDOCAINE HYDROCHLORIDE 75 MG: 20 INJECTION, SOLUTION EPIDURAL; INFILTRATION; INTRACAUDAL; PERINEURAL at 07:38

## 2020-02-13 NOTE — BRIEF OP NOTE
BRIEF OPERATIVE NOTE    Date of Procedure: 2/13/2020   Preoperative Diagnosis: CERVICAL ADENITIS, LEFT NECK  Postoperative Diagnosis: CERVICAL ADENITIS, LEFT NECK    Procedure(s):  EXCISION OF DEEP TISSUE MASS LEFT NECK WITH NERVE MONITORING,   ENDOSCOPIC EXCISION OF PARANASAL NEOPLASM WITH BIOPSY  Surgeon(s) and Role:     * Judith Pierce MD - Primary     * Mike Potts MD - Assisting         Surgical Assistant: dr Citlali Guerra    Surgical Staff:  Circ-1: Arvin Suárez RN  Scrub RN-1: Bhavna Villatoro RN  Float Staff: Arturo Villareal RN  Event Time In Time Out   Incision Start 6233    Incision Close 3096      Anesthesia: General   Estimated Blood Loss: min to 5 ml   Specimens:   ID Type Source Tests Collected by Time Destination   1 : LEFT DEEP NECK MASS Fresh Neck  Judith Pierce MD 2/13/2020 0827 Pathology   2 : LEFT NASOPHARYNGEAL MASS Fresh Other                  Judith Pierce MD 2/13/2020 7294 Pathology      Findings: multiple nodes in neck, fleshy mass in nose    Complications: none at time of notation     Implants: * No implants in log *

## 2020-02-13 NOTE — PERIOP NOTES
Patient speaks Mariely Hippo and speaks some [place]. Attempted to get [facility] , but unable to obtain .  with patient, who speaks [place].

## 2020-02-13 NOTE — DISCHARGE INSTRUCTIONS
[facility]    Head and Neck Post Operative Instructions    1. DIET  Start a soft diet and progress to usual diet as tolerated, unless otherwise directed. It is important to remember that good overall diet and health promotes healing. 2.  ACTIVITY : see below   No heavy exertion or heavy lifting for 10 days. Light activities are permitted but, avoid any stretching or bending of the neck for 10 days. 3.  WOUND CARE: after drain removal   A. Apply antibiotic ointment twice daily to suture sites for 2 weeks , use bacitracin . B. One day after drain removal , May shower or bathe following surgery but make sure to apply antibiotic ointment prior to doing so and do not submerge the area in water. C. If you have a paper tape dressing, make every attempt to keep it dry until it is removed. 4.  THINGS TO BE CONCERNED ABOUT  Please call the office for any of these changes  A. Increasing pain  B. New red discoloration  C. New drainage of any description  D. Increasing warmth of wound    5. DRAIN MANAGEMENT  A. Follow nursing instructions: change collection tube this evening   B. Generally, your doctor will plan to have drains removed between 1 and 3 days. 6. LONG-TERM WOUND HEALING  A. Expect the wound to have a slight ridge for up to 6 weeks. This is usually by design for optimal wound healing. B. Local numbness is usual around wound sites, and can last up to and beyond 3 months. C. To minimize the prolonged redness or pigmentation around a wound, we recommend use of sunscreen for 3 months or longer on healed wounds. If you have any questions or concerns following your surgery, do not hesitate to contact our office at    Office Phone:  [phone]  office hours are 8:00 a.m. to 4:30 p.m. You should be able to reach us after hours by calling the regular office number.   If for some reason you are not able to reach our after hours service through this main number you may call them directly at [phone]. [facility]    POST OPERATIVE INSTRUCTIONS - re endoscopic nasal biopsy        ACTIVITY RESTRICTIONS:    The following guidelines should be followed until your doctor tells you otherwise:    Do not lift anything over five pounds. Do not bend over. Avoid doing things like tying your shoes or picking things up off the floor. Do not do heavy exercise or play contact sports. Do not strain for a bowel movement. If you are constipated, take a stool softener or a gentle laxative. Go back to work or school only when your doctor says you can. Do not blow your nose and if you have to sneeze, sneeze with your mouth open. HOW TO TAKE CARE OF YOUR NOSE      You may have some bloody thick mucus drainage from the nose. It will gradually go away. Applying a small gauze dressing beneath your nose will help absorb drainage. After a few days you will probably not need to use the dressing any longer. If you have a bloody saturated gauze more than once an hour, call the office. You may have some swelling of your nose, upper lip, cheeks or around your eyes for several days after surgery. This swelling will gradually go away. You can help to reduce it by sleeping with your head elevated on two or three pillows and by staying in an upright position as much as possible during your waking hours. Avoid smoke, dust, fumes or anything else that might irritate your nose. Protect yourself from any unexpected injury to your nose or face, such as being hit by small children or a restless bedmate. Do not put anything into your nose. This includes your fingers, cotton-tipped applicators, tissues or handkerchiefs. Any of these items might accidentally injure your nose. You may find that a cool mist room humidifier is helpful in decreasing the amount of dryness in your nose and mouth.          After your surgery you should use a nasal spray such as Blackduck or [employer]. Use 4 sprays in each nostril every two hours while awake to help prevent crusts from forming in your nose. If you have any questions or concerns following your surgery do not hesitate to contact our office at     [facility] office hours are 8:00 a.m. to 4:30 p.m. You should be able to reach us after hours by calling the regular office number. If for some reason you are not able to reach our [address] [place] [address] service through this main number you may call them directly at [phone].

## 2020-02-13 NOTE — OP NOTES
[address]  OPERATIVE REPORT    Name:  Corey Batista  MR#:  [id number]  :  1965  ACCOUNT #:  [de-identified]  DATE OF SERVICE:  2020      PREOPERATIVE DIAGNOSES:  Cervical adenitis, left neck mass, nasopharyngeal mass. POSTOPERATIVE DIAGNOSES:  Cervical adenitis, left neck mass, nasopharyngeal mass. PROCEDURES PERFORMED:  1. Excision of deep neck mass of the left neck with intraoperative nerve monitoring for 1 hour. 2.  Intraoperative nerve monitoring of facial nerve. 3.  Nasal endoscopy with biopsy of nasopharyngeal mass. SURGEON:  Poly Banda MD    ASSISTANT:  Dr. Neris Aparicio. ANESTHESIA:  General endotracheal anesthesia. COMPLICATIONS:  None noted at the time of notation dictation. SPECIMENS REMOVED:  Large left neck mass, lymph node, and nasopharyngeal biopsy x2. IMPLANTS:  None. ESTIMATED BLOOD LOSS:  5-10 mL. FLUIDS:  Crystalloid. REASON FOR SURGERY:  The patient is a 54-year-old female who presented to my office just a month ago with concern about neck lumps or masses. They looked concerning. Immediately, the patient was ordered an ultrasound. Ultrasound biopsies showed suspicious for possible lymphoma versus carcinoma. CT scan also confirmed diffuse lymphadenopathy. The patient was encouraged to undergo a surgical biopsy, and also the nasopharyngeal tissue was identified during CT scan which was also recommended to have this biopsied. After some consideration and discussion, the family and the patient agreed to proceed forward. Much of this discussion was with the patient's  who communicated to his wife because of language limitations.   The need, risks, alternatives and possible complications that were reviewed included such things as, but not limited to postoperative bleeding, infection, nerve injury to among other things the facial nerve marginal branch, particularly of great auricular nerve, spinal accessory nerve and other neurovascular structures as well as risk factors associated with postoperative infection, bleeding, injury to various and sundry tissues around the mass as well as nasopharyngeal bleeding requiring further treatment and packing and other intraoperative and even postoperative issues. These as well as general pulmonary and cardiac issues were reviewed the family as much as conceivably possible and consented for both with the discussion with the patient's  and the patient. Consent was obtained and verified. DESCRIPTION OF PROCEDURE/NARRATIVE SUMMARY:  The patient was taken to the operating room after identifying the patient in the holding area. She was given a general endotracheal anesthetic without neuromuscular blocking agent. The patient was appropriately prepped and positioned. A time-out was done initially by the surgeon identifying the patient and parties in the room, all agreed with the plan of action. The patient was then prepped and draped in the standard fashion performing a neck surgery. After hooking up the nerve integrity monitoring and using it for the entire 1 hour plus case, the prep and drape had been completed only after 5 mL of lidocaine with epinephrine was injected into a site on the left about two fingerbreadths below the margin of the mandible. The operation focused on the neck after a formal prep and drape. After vasoconstrictive time was allotted for, an incision was made about a fingerbreadth and a half below the margin of the mandible. It was no less than 4-6 cm in length. It was carried quickly and decisively down to the platysma. Superior and inferior flaps were carefully elevated. The great auricular nerve was seen at the posterior aspect of the incision and was pushed out of harm's way. The SCM was identified. The mass was just below it. The SCM was reflected posteriorly with a grasper.   Care was taken to avoid any injury to areas consistent with Erb's point or where the spinal accessory nerve might be. The mass was then appreciated during circumferential dissection. The mass was freed up of fascial attachments which adhered the mass to the SCMs underbelly. The mass was circumferentially dissected. At this time, the internal jugular vein was identified attached to the mass. Using [facility] dissectors, the mass was removed from its attachments from the internal jugular vein with bipolar cautery carefully utilized. In a systematic manner, the mass was sequentially freed up of all attachments which were many and dense. Areas of bleeding were attended to with careful bipolar cautery. The mass was eventually removed out of its entirety and the internal jugular vein fell back into the patient's body without any damage, harm or injury. No evidence of neurovascular injury was noted. A Valsalva was done for 20-40 seconds. Small amount of skin bleeding was noted, it was treated with bipolar cautery. The edges were then closed after putting a small drain in, a Vacutainer cell drain, and the incision was closed closing the platysma and the dermis with 3-0 Vicryl and then the skin with fast-absorbing 5-0 plain. Antibiotic ointment was applied and a dressing applied. The patient's table was then rotated slightly for another identified procedure already done to the time-out. The nasal endoscopy was set up for, equipment readied, and the endoscope was placed. A packing with decongested Afrin pledgets was used for 5-10 minutes. The endoscope was then passed in the left side. It revealed a lumpy mass consistent with the CT scan findings. The mass was then injected with 3 mL of lidocaine with epinephrine, same dilutional strength as used earlier. Vasoconstrictive time was allotted for. Using a Weil-Blagene straight, two large biopsy specimens were taken and put on Telfa and sent for fresh identification analysis techniques.   The area was packed with adrenaline patties and then treated with pinpoint silver nitrate cautery. At this point, the area appeared to be free of any bleeding. The packs were removed except for one which was removed after extubation. No bleeding was noted. The patient was taken to recovery room and appeared to be doing well. Prior to extubation, the sponge, needle, instrument count and pledget count was deemed accurate and correct per nursing staff.         Valerio Kern MD      [id number]  D:  02/13/2020 10:21  T:  02/13/2020 14:04  JOB #:  [id number]

## 2020-02-13 NOTE — ANESTHESIA POSTPROCEDURE EVALUATION
Post-Anesthesia Evaluation and Assessment    Patient: Luis Meyer MRN: [id number]  SSN: [id number]    YOB: 1965  Age: 47 y.o. Sex: female      I have evaluated the patient and they are stable and ready for discharge from the PACU. Cardiovascular Function/Vital Signs  Visit Vitals  /64   Pulse 98   Temp 36.1 °C (97 °F)   Resp 19   Ht 5' 1.5\" (1.562 m)   Wt 60.3 kg (133 lb)   SpO2 100%   BMI 24.72 kg/m²       Patient is status post General anesthesia for Procedure(s) with comments:  EXCISION OF DEEP TISSUE MASS LEFT NECK WITH NERVE MONITORING, ENDOSCOPIC EXCISION OF PARANASAL NEOPLASM WITH BIOPSY - LEFT NECK   NOSE. Nausea/Vomiting: None    Postoperative hydration reviewed and adequate. Pain:  Pain Scale 1: Numeric (0 - 10) (02/13/20 0945)  Pain Intensity 1: 5 (02/13/20 0945)   Managed    Neurological Status:   Neuro (WDL): Within Defined Limits (02/13/20 0931)  Neuro  LUE Motor Response: Purposeful (02/13/20 0931)  LLE Motor Response: Purposeful (02/13/20 0931)  RUE Motor Response: Purposeful (02/13/20 0931)  RLE Motor Response: Purposeful (02/13/20 0931)   At baseline    Mental Status, Level of Consciousness: Alert and  oriented to person, place, and time    Pulmonary Status:   O2 Device: Room air (02/13/20 0931)   Adequate oxygenation and airway patent    Complications related to anesthesia: None    Post-anesthesia assessment completed. No concerns    Signed By: Candis Cage MD     February 13, 2020              Procedure(s):  EXCISION OF DEEP TISSUE MASS LEFT NECK WITH NERVE MONITORING, ENDOSCOPIC EXCISION OF PARANASAL NEOPLASM WITH BIOPSY. general    <BSHSIANPOST>    Vitals Value Taken Time   /61 2/13/2020  9:45 AM   Temp 36.1 °C (97 °F) 2/13/2020  9:31 AM   Pulse 91 2/13/2020  9:56 AM   Resp 18 2/13/2020  9:56 AM   SpO2 100 % 2/13/2020  9:56 AM   Vitals shown include unvalidated device data.

## 2020-02-13 NOTE — H&P
[facility]      The history and physical is reviewed by me and updated today. There are no changes from the previous history and physical.  This file should be an external document in the notes section or could be in the media portion of the chart. Pt has a biopsy of the neck mass showing possible neoplasm , maybe lymphoma , and ct also showing concern . Surgical biopsy of specimen needed . Tarsha Contreras The risks of the procedure including facial nerve , spinal accessory , greater auricular among other areas / nerves of concern could be injured , temporarily or permanently and in general , bleeding, infection, problems with anesthesia, need for further procedures, and death have been discussed with the patient. We also discussed the fact that symptoms may not improve or potentially could worsen. Also discussed the alternatives of continued medical management. The patient desires to proceed.     Kristyn Batista MD

## 2020-02-13 NOTE — ROUTINE PROCESS
Patient: Joanne Summers MRN: [id number]  SSN: [id number] YOB: 1965  Age: 47 y.o. Sex: female Patient is status post Procedure(s) with comments: 
EXCISION OF DEEP TISSUE MASS LEFT NECK WITH NERVE MONITORING, ENDOSCOPIC EXCISION OF PARANASAL NEOPLASM WITH BIOPSY - LEFT NECK  
NOSE. Surgeon(s) and Role: 
   * Carolina Guy MD - Primary Mark Aranda MD - Assisting Local/Dose/Irrigation:   
 
             
Peripheral IV 02/13/20 Left Forearm (Active) Site Assessment Clean, dry, & intact 2/13/2020  7:01 AM  
Dressing Status Clean, dry, & intact 2/13/2020  7:01 AM  
Dressing Type Transparent 2/13/2020  7:01 AM  
      
TLS Drain 02/13/20 Other (Comment) Neck (Active) Dressing/Packing:      
Splint/Cast:  ] Other:

## 2020-02-17 ENCOUNTER — TELEPHONE (OUTPATIENT)
Dept: FAMILY MEDICINE CLINIC | Age: 55
End: 2020-02-17

## 2020-02-17 NOTE — TELEPHONE ENCOUNTER
Forwarded from the call center;          Patient surgery was successful and patient had the surgery at [facility]. The results from the hospital will be available to view when needed. Please call patient  for questions.

## 2020-02-19 NOTE — TELEPHONE ENCOUNTER
I told him we hope she heals quickly. He said they have not gotten the pathology back yet. She is doing well.

## 2020-03-18 ENCOUNTER — TELEPHONE (OUTPATIENT)
Dept: FAMILY MEDICINE CLINIC | Age: 55
End: 2020-03-18

## 2020-03-18 NOTE — TELEPHONE ENCOUNTER
Please call [facility] Dr Sola Ponce office to request medical records on this pt. Recent dx with ENT cancer.

## 2020-03-18 NOTE — TELEPHONE ENCOUNTER
Patients  is calling and would like to have Dr. Wayne Josue call him. Please call  back at [phone]. Wants to discuss cancer diagnosis and appt he wants to schedule.

## 2020-03-18 NOTE — TELEPHONE ENCOUNTER
I spoke with pt's . She was dx with Nasopharyngeal cancer. Referred by Dr Ector Carrero to [address] [place] [address] [facility] They have a lot of questions and concerns. Advised to set appt. I have not received any records from ENT or [facility]. Call coordinator [place] @ [phone] for records.

## 2020-04-30 ENCOUNTER — TELEPHONE (OUTPATIENT)
Dept: FAMILY MEDICINE CLINIC | Age: 55
End: 2020-04-30

## 2020-04-30 NOTE — TELEPHONE ENCOUNTER
Called and pt's  stated she still has 4 more weeks of treatment and then will call and schedule a visit after that is done

## 2020-04-30 NOTE — TELEPHONE ENCOUNTER
Please call her  to set up a virtual visit. I have not seen her in over a year and I need update on her condition (cnacer).

## 2021-06-20 NOTE — PROGRESS NOTES
Subjective:   54 y.o. female for Well Woman Check. Patient's last menstrual period was 02/12/2015. Social History: single partner, contraception - post menopausal status. Pertinent past medical hstory: no history of HTN, DVT, CAD, DM, liver disease, migraines or smoking. 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  OTHER Heal N Sooth      ascorbic acid, vitamin C, (VITAMIN C) 500 mg tablet Take 500 mg by mouth daily.  multivitamin (ONE A DAY) tablet Take 1 Tab by mouth daily.  folic acid (FOLVITE) 1 mg tablet Take 2 mg by mouth daily. 11    polyethylene glycol (MIRALAX) 17 gram/dose powder  g, PO, daily, 0 Refills      cholecalciferol, vitamin D3, (VITAMIN D3) 2,000 unit tab Take 4,000 Units by mouth daily. takes sometimes, 0 Refills      B.infantis-B.ani-B.long-B.bifi (PROBIOTIC 4X) 10-15 mg TbEC Take 1 Tab by mouth daily.  FISH  mg cap Take 1 Tab by mouth daily.  calcium 500 mg tab Take 1 Tab by mouth.        Allergies   Allergen Reactions    Biaxin [Clarithromycin] Shortness of Breath and Swelling    Adhesive Tape-Silicones Rash    Ethanol (Ethyl Alcohol) Rash     Cannot digest    Tobradex [Tobramycin-Dexamethasone] Other (comments)     REDNESS IN EYE    Tormentil Other (comments)     Redness - PATIENT DENIES ALLERGY AS OF 2/11/2020     Past Medical History:   Diagnosis Date    Bursitis/tendonitis, shoulder     Gastroparesis     GERD (gastroesophageal reflux disease)     Neck mass     Rheumatoid arthritis ([facility]      Past Surgical History:   Procedure Laterality Date    HX COLONOSCOPY      HX ENDOSCOPY      HX LYMPH NODE DISSECTION Left 02/2020    HX OTHER SURGICAL  2008    right knuckle surgery to remove janet thorn    HX TUMOR REMOVAL  02/2020    Nasopharygeal     Family History   Problem Relation Age of Onset    Hypertension Mother     Hypertension Sister    [de-identified] Elevated Lipids Sister      Social History     Tobacco Use    Smoking status: Never Smoker    Smokeless tobacco: Never Used   Substance Use Topics    Alcohol use: No        ROS:  Feeling well. No dyspnea or chest pain on exertion. No abdominal pain, change in bowel habits, black or bloody stools. No urinary tract symptoms. GYN ROS: . No neurological complaints. Weight loss since dx with NP cancer in 2020. Has undergone chemo and radiation tx at [facility]. She C/O finger swelling and pain. Hx RA but stopped taking MTX last year with cancer treatment. She is hesitant to go back on MTX as it may affect her immune system and possible link to cancer recurrence. She had been seeing Dr Codie Beltran at [facility]. Pt is back to working at [employer] 4 days a week. Objective:     Visit Vitals  /72 (BP 1 Location: Left arm, BP Patient Position: Sitting, BP Cuff Size: Small adult)   Pulse 82   Temp 98.1 °F (36.7 °C) (Temporal)   Resp 14   Ht 5' 1.5\" (1.562 m)   Wt 104 lb 3.2 oz (47.3 kg)   LMP 02/12/2015   SpO2 98%   BMI 19.37 kg/m²     The patient appears well, alert, oriented x 3, in no distress. ENT normal.  Neck supple. No adenopathy or thyromegaly. SHIRLEY. Lungs are clear, good air entry, no wheezes, rhonchi or rales. S1 and S2 normal, no murmurs, regular rate and rhythm. Abdomen soft without tenderness, guarding, mass or organomegaly. Extremities show few swollen PIP fingers, normal peripheral pulses. Neurological is normal, no focal findings.         Assessment/Plan:   well woman  additional lab tests per orders  return annually or prn    ICD-10-CM ICD-9-CM    1. Routine adult health maintenance  Z00.00 V70.0 CBC WITH AUTOMATED DIFF      METABOLIC PANEL, COMPREHENSIVE      LIPID PANEL      TSH 3RD GENERATION      T4, FREE      HEMOGLOBIN A1C WITH EAG      URINALYSIS W/ RFLX MICROSCOPIC      URINALYSIS W/ RFLX MICROSCOPIC      HEMOGLOBIN A1C WITH EAG      T4, FREE      TSH 3RD GENERATION      LIPID PANEL      METABOLIC PANEL, COMPREHENSIVE      CBC WITH AUTOMATED DIFF   2. Acquired hypothyroidism  E03.9 244.9    3. Vitamin D deficiency  E55.9 268.9 VITAMIN D, 25 HYDROXY      VITAMIN D, 25 HYDROXY   4. Rheumatoid arthritis, involving unspecified site, unspecified whether rheumatoid factor present (HCC)  M06.9 714.0 SED RATE (ESR)      C REACTIVE PROTEIN, QT      REFERRAL TO RHEUMATOLOGY      C REACTIVE PROTEIN, QT      SED RATE (ESR)   5. Nasopharynx cancer (HCC)  C11.9 147.9    . Order labs. Wishes to hold off PAP and mammo this year. Had PET scan negative at [facility] a few months ago. Advised to take Ibuprofen PRN for joint pain. She requested referral to another [facility] for RA to discuss treatment options. FU with [facility]  Refer to Dr Cherie Wooten  We discussed [place] vaccination. She will plan to get this.

## 2021-06-21 ENCOUNTER — OFFICE VISIT (OUTPATIENT)
Dept: FAMILY MEDICINE CLINIC | Age: 56
End: 2021-06-21
Payer: COMMERCIAL

## 2021-06-21 VITALS
SYSTOLIC BLOOD PRESSURE: 104 MMHG | HEIGHT: 62 IN | TEMPERATURE: 98.1 F | DIASTOLIC BLOOD PRESSURE: 72 MMHG | HEART RATE: 82 BPM | OXYGEN SATURATION: 98 % | BODY MASS INDEX: 19.17 KG/M2 | WEIGHT: 104.2 LBS | RESPIRATION RATE: 14 BRPM

## 2021-06-21 DIAGNOSIS — E55.9 VITAMIN D DEFICIENCY: ICD-10-CM

## 2021-06-21 DIAGNOSIS — Z00.00 ROUTINE ADULT HEALTH MAINTENANCE: Primary | ICD-10-CM

## 2021-06-21 DIAGNOSIS — M06.9 RHEUMATOID ARTHRITIS, INVOLVING UNSPECIFIED SITE, UNSPECIFIED WHETHER RHEUMATOID FACTOR PRESENT (HCC): ICD-10-CM

## 2021-06-21 DIAGNOSIS — E03.9 ACQUIRED HYPOTHYROIDISM: ICD-10-CM

## 2021-06-21 DIAGNOSIS — C11.9 NASOPHARYNX CANCER (HCC): ICD-10-CM

## 2021-06-21 LAB
COMMENT, HOLDF: NORMAL
SAMPLES BEING HELD,HOLD: NORMAL

## 2021-06-21 PROCEDURE — 99396 PREV VISIT EST AGE 40-64: CPT | Performed by: FAMILY MEDICINE

## 2021-06-21 RX ORDER — DICLOFENAC SODIUM 10 MG/G
GEL TOPICAL 4 TIMES DAILY
COMMUNITY
End: 2021-11-19 | Stop reason: ALTCHOICE

## 2021-06-21 RX ORDER — PILOCARPINE HYDROCHLORIDE 5 MG/1
TABLET, FILM COATED ORAL
COMMUNITY
Start: 2021-06-07

## 2021-06-21 NOTE — PROGRESS NOTES
Identified pt with two pt identifiers(name and ). Chief Complaint   Patient presents with    Complete Physical      Vitals:    21 1340   BP: 104/72   Pulse: 82   Resp: 14   Temp: 98.1 °F (36.7 °C)   TempSrc: Temporal   SpO2: 98%   Weight: 104 lb 3.2 oz (47.3 kg)   Height: 5' 1.5\" (1.562 m)   PainSc:   5   PainLoc: Finger   LMP: 2015      Health Maintenance Due   Topic    COVID-19 Vaccine (1)    Shingrix Vaccine Age 50> (1 of 2)    PAP AKA CERVICAL CYTOLOGY     Breast Cancer Screen Mammogram        Depression Screening:  :     3 most recent PHQ Screens 2021   Little interest or pleasure in doing things Not at all   Feeling down, depressed, irritable, or hopeless Several days   Total Score PHQ 2 1        Fall Risk Assessment:  :     Fall Risk Assessment, last 12 mths 10/20/2017   Able to walk? Yes   Fall in past 12 months? No       Abuse Screening:  :     Abuse Screening Questionnaire 2019   Do you ever feel afraid of your partner? N   Are you in a relationship with someone who physically or mentally threatens you? N   Is it safe for you to go home? Y        Coordination of Care Questionnaire:  :     1. Have you been to the ER, urgent care clinic since your last visit? Hospitalized since your last visit? NO    2. Have you seen or consulted any other health care providers outside of the [address] [place] [address] since your last visit? Include any pap smears or colon screening.  No

## 2021-06-21 NOTE — PATIENT INSTRUCTIONS
Rheumatoid Arthritis (RA) Diet: Care Instructions Your Care Instructions The best diet for people with rheumatoid arthritis is a healthy, balanced diet. This is one that is low in saturated fat and salt and high in fiber and complex carbohydrate (whole grains, beans, fruits, and vegetables). Fish oil (omega-3 fatty acids) has a modest effect in reducing inflammation, and eating fish may improve symptoms. People who have rheumatoid arthritis have a high risk of developing osteoporosis. To help prevent this disease, get plenty of calcium and vitamin D. Follow-up care is a key part of your treatment and safety. Be sure to make and go to all appointments, and call your doctor if you are having problems. It's also a good idea to know your test results and keep a list of the medicines you take. How can you care for yourself at home? · Try to eat at least 2 servings of fish each week. Oily fish, which contain omega-3 fatty acids, include: ? [place]. ? [place]. ? Mackerel. ? Lake trout. ? Herring. ? Sardines. · If you're pregnant, talk to your doctor about eating fish. Pregnant women shouldn't eat certain types of fish that have high mercury content. · You can get calcium and vitamin D by drinking milk fortified with vitamin D. Four glasses of milk a day provide about 1,200 milligrams (mg) of calcium. Other common foods with calcium: 
? Yogurt (plain or low-fat). An 8-ounce serving provides 415 mg of calcium. ? Cheddar cheese. A 1½-ounce serving provides 306 mg. 
? Milk (skim, 2%, or whole). A 1-cup serving provides about 300 mg. 
? Cottage cheese (1% milk fat). A 1-cup serving provides 138 mg. 
· If you can't eat or drink dairy foods, you can get calcium and vitamin D from: 
? Calcium-fortified orange juice. A 1-cup serving provides 500 mg of calcium. ? Calcium-enriched soy milk. A 1-cup serving provides 282 mg of calcium. ? Almonds. A 1-ounce serving (about 24 nuts) provides 75 mg of calcium. ? Canned salmon. A 3-ounce serving provides 180 mg of calcium. ? Tofu (firm, made with calcium sulfate). A ½-cup serving provides 204 mg. · You may need to take a calcium supplement to make sure you are getting the calcium you need. Where can you learn more? Go to http://www.gray.com/ Enter Q201 in the search box to learn more about \"Rheumatoid Arthritis (RA) Diet: Care Instructions. \" Current as of: December 17, 2020               Content Version: 12.8 © 0829-7698 [employer]. Care instructions adapted under license by [employer] (which disclaims liability or warranty for this information). If you have questions about a medical condition or this instruction, always ask your healthcare professional. [address] any warranty or liability for your use of this information.

## 2021-06-22 LAB
25(OH)D3 SERPL-MCNC: 88.2 NG/ML (ref 30–100)
ALBUMIN SERPL-MCNC: 4 G/DL (ref 3.5–5)
ALBUMIN/GLOB SERPL: 0.9 {RATIO} (ref 1.1–2.2)
ALP SERPL-CCNC: 70 U/L (ref 45–117)
ALT SERPL-CCNC: 18 U/L (ref 12–78)
ANION GAP SERPL CALC-SCNC: 6 MMOL/L (ref 5–15)
APPEARANCE UR: CLEAR
AST SERPL-CCNC: 18 U/L (ref 15–37)
BASOPHILS # BLD: 0 K/UL (ref 0–0.1)
BASOPHILS NFR BLD: 1 % (ref 0–1)
BILIRUB SERPL-MCNC: 0.3 MG/DL (ref 0.2–1)
BILIRUB UR QL: NEGATIVE
BUN SERPL-MCNC: 9 MG/DL (ref 6–20)
BUN/CREAT SERPL: 21 (ref 12–20)
CALCIUM SERPL-MCNC: 10.1 MG/DL (ref 8.5–10.1)
CHLORIDE SERPL-SCNC: 106 MMOL/L (ref 97–108)
CHOLEST SERPL-MCNC: 196 MG/DL
CO2 SERPL-SCNC: 27 MMOL/L (ref 21–32)
COLOR UR: NORMAL
CREAT SERPL-MCNC: 0.43 MG/DL (ref 0.55–1.02)
CRP SERPL-MCNC: 0.31 MG/DL (ref 0–0.6)
DIFFERENTIAL METHOD BLD: NORMAL
EOSINOPHIL # BLD: 0.1 K/UL (ref 0–0.4)
EOSINOPHIL NFR BLD: 1 % (ref 0–7)
ERYTHROCYTE [DISTWIDTH] IN BLOOD BY AUTOMATED COUNT: 14 % (ref 11.5–14.5)
ERYTHROCYTE [SEDIMENTATION RATE] IN BLOOD: 82 MM/HR (ref 0–30)
EST. AVERAGE GLUCOSE BLD GHB EST-MCNC: 105 MG/DL
GLOBULIN SER CALC-MCNC: 4.3 G/DL (ref 2–4)
GLUCOSE SERPL-MCNC: 77 MG/DL (ref 65–100)
GLUCOSE UR STRIP.AUTO-MCNC: NEGATIVE MG/DL
HBA1C MFR BLD: 5.3 % (ref 4–5.6)
HCT VFR BLD AUTO: 37.3 % (ref 35–47)
HDLC SERPL-MCNC: 75 MG/DL
HDLC SERPL: 2.6 {RATIO} (ref 0–5)
HGB BLD-MCNC: 12 G/DL (ref 11.5–16)
HGB UR QL STRIP: NEGATIVE
IMM GRANULOCYTES # BLD AUTO: 0 K/UL (ref 0–0.04)
IMM GRANULOCYTES NFR BLD AUTO: 0 % (ref 0–0.5)
KETONES UR QL STRIP.AUTO: NEGATIVE MG/DL
LDLC SERPL CALC-MCNC: 101.2 MG/DL (ref 0–100)
LEUKOCYTE ESTERASE UR QL STRIP.AUTO: NEGATIVE
LYMPHOCYTES # BLD: 1.6 K/UL (ref 0.8–3.5)
LYMPHOCYTES NFR BLD: 29 % (ref 12–49)
MCH RBC QN AUTO: 27.3 PG (ref 26–34)
MCHC RBC AUTO-ENTMCNC: 32.2 G/DL (ref 30–36.5)
MCV RBC AUTO: 85 FL (ref 80–99)
MONOCYTES # BLD: 0.5 K/UL (ref 0–1)
MONOCYTES NFR BLD: 9 % (ref 5–13)
NEUTS SEG # BLD: 3.2 K/UL (ref 1.8–8)
NEUTS SEG NFR BLD: 60 % (ref 32–75)
NITRITE UR QL STRIP.AUTO: NEGATIVE
NRBC # BLD: 0 K/UL (ref 0–0.01)
NRBC BLD-RTO: 0 PER 100 WBC
PH UR STRIP: 7.5 [PH] (ref 5–8)
PLATELET # BLD AUTO: 234 K/UL (ref 150–400)
PMV BLD AUTO: 10.6 FL (ref 8.9–12.9)
POTASSIUM SERPL-SCNC: 3.9 MMOL/L (ref 3.5–5.1)
PROT SERPL-MCNC: 8.3 G/DL (ref 6.4–8.2)
PROT UR STRIP-MCNC: NEGATIVE MG/DL
RBC # BLD AUTO: 4.39 M/UL (ref 3.8–5.2)
SODIUM SERPL-SCNC: 139 MMOL/L (ref 136–145)
SP GR UR REFRACTOMETRY: <1.005 (ref 1–1.03)
T4 FREE SERPL-MCNC: 0.7 NG/DL (ref 0.8–1.5)
TRIGL SERPL-MCNC: 99 MG/DL (ref ?–150)
TSH SERPL DL<=0.05 MIU/L-ACNC: 12.4 UIU/ML (ref 0.36–3.74)
UROBILINOGEN UR QL STRIP.AUTO: 0.2 EU/DL (ref 0.2–1)
VLDLC SERPL CALC-MCNC: 19.8 MG/DL
WBC # BLD AUTO: 5.4 K/UL (ref 3.6–11)

## 2021-06-27 ENCOUNTER — TELEPHONE (OUTPATIENT)
Dept: FAMILY MEDICINE CLINIC | Age: 56
End: 2021-06-27

## 2021-06-27 DIAGNOSIS — E03.9 ACQUIRED HYPOTHYROIDISM: Primary | ICD-10-CM

## 2021-06-27 RX ORDER — LEVOTHYROXINE SODIUM 50 UG/1
50 TABLET ORAL
Qty: 90 TABLET | Refills: 1 | Status: SHIPPED | OUTPATIENT
Start: 2021-06-27 | End: 2021-11-30

## 2021-06-27 NOTE — PROGRESS NOTES
Call pt's  Rick Carmona. Labs show pt has under active thyroid. Need to take a thyroid hormone tablet to replace missing hormone. I will send order. Take this every morning before breakfast with just water. Wait 30 minutes before eating or taking other med. Follow up in 2-3 months for repeat blood test.   Also her Sed rate is HIGH. Consistent with flare up of her RA. FU with Rheumatology. Normal Vit D, blood cell counts, sugar, liver, kidney function. Send him copy of labs to share with specialists at [address] [place] [address].

## 2021-06-28 ENCOUNTER — TELEPHONE (OUTPATIENT)
Dept: FAMILY MEDICINE CLINIC | Age: 56
End: 2021-06-28

## 2021-06-28 NOTE — TELEPHONE ENCOUNTER
----- Message from Paul Cruz MD sent at 6/27/2021  5:28 PM EDT -----  Call pt's  Moose Ruano. Labs show pt has under active thyroid. Need to take a thyroid hormone tablet to replace missing hormone. I will send order. Take this every morning before breakfast with just water. Wait 30 minutes before eating or taking other med. Follow up in 2-3 months for repeat blood test.   Also her Sed rate is HIGH. Consistent with flare up of her RA. FU with Rheumatology. Normal Vit D, blood cell counts, sugar, liver, kidney function. Send him copy of labs to share with specialists at [facility].

## 2021-07-12 ENCOUNTER — TELEPHONE (OUTPATIENT)
Dept: FAMILY MEDICINE CLINIC | Age: 56
End: 2021-07-12

## 2021-07-12 NOTE — LETTER
7/12/2021    Ms. [address] [place] [address]        Dear Mari Sandoval:    Please find your most recent results below.       Results for orders placed or performed in visit on 06/21/21   URINALYSIS W/ RFLX MICROSCOPIC   Result Value Ref Range    Color YELLOW/STRAW      Appearance CLEAR CLEAR      Specific gravity <1.005 1.003 - 1.030    pH (UA) 7.5 5.0 - 8.0      Protein Negative Negative mg/dL    Glucose Negative Negative mg/dL    Ketone Negative Negative mg/dL    Bilirubin Negative Negative      Blood Negative Negative      Urobilinogen 0.2 0.2 - 1.0 EU/dL    Nitrites Negative Negative      Leukocyte Esterase Negative Negative     C REACTIVE PROTEIN, QT   Result Value Ref Range    C-Reactive protein 0.31 0.00 - 0.60 mg/dL   SED RATE (ESR)   Result Value Ref Range    Sed rate, automated 82 (H) 0 - 30 mm/hr   VITAMIN D, 25 HYDROXY   Result Value Ref Range    Vitamin D 25-Hydroxy 88.2 30 - 100 ng/mL   HEMOGLOBIN A1C WITH EAG   Result Value Ref Range    Hemoglobin A1c 5.3 4.0 - 5.6 %    Est. average glucose 105 mg/dL   T4, FREE   Result Value Ref Range    T4, Free 0.7 (L) 0.8 - 1.5 NG/DL   TSH 3RD GENERATION   Result Value Ref Range    TSH 12.40 (H) 0.36 - 3.74 uIU/mL   LIPID PANEL   Result Value Ref Range    Cholesterol, total 196 <200 MG/DL    Triglyceride 99 <150 MG/DL    HDL Cholesterol 75 MG/DL    LDL, calculated 101.2 (H) 0 - 100 MG/DL    VLDL, calculated 19.8 MG/DL    CHOL/HDL Ratio 2.6 0.0 - 5.0     METABOLIC PANEL, COMPREHENSIVE   Result Value Ref Range    Sodium 139 136 - 145 mmol/L    Potassium 3.9 3.5 - 5.1 mmol/L    Chloride 106 97 - 108 mmol/L    CO2 27 21 - 32 mmol/L    Anion gap 6 5 - 15 mmol/L    Glucose 77 65 - 100 mg/dL    BUN 9 6 - 20 MG/DL    Creatinine 0.43 (L) 0.55 - 1.02 MG/DL    BUN/Creatinine ratio 21 (H) 12 - 20      GFR est AA >60 >60 ml/min/1.73m2    GFR est non-AA >60 >60 ml/min/1.73m2    Calcium 10.1 8.5 - 10.1 MG/DL    Bilirubin, total 0.3 0.2 - 1.0 MG/DL    ALT (SGPT) 18 12 - 78 U/L    AST (SGOT) 18 15 - 37 U/L    Alk. phosphatase 70 45 - 117 U/L    Protein, total 8.3 (H) 6.4 - 8.2 g/dL    Albumin 4.0 3.5 - 5.0 g/dL    Globulin 4.3 (H) 2.0 - 4.0 g/dL    A-G Ratio 0.9 (L) 1.1 - 2.2     CBC WITH AUTOMATED DIFF   Result Value Ref Range    WBC 5.4 3.6 - 11.0 K/uL    RBC 4.39 3.80 - 5.20 M/uL    HGB 12.0 11.5 - 16.0 g/dL    HCT 37.3 35.0 - 47.0 %    MCV 85.0 80.0 - 99.0 FL    MCH 27.3 26.0 - 34.0 PG    MCHC 32.2 30.0 - 36.5 g/dL    RDW 14.0 11.5 - 14.5 %    PLATELET 875 278 - 707 K/uL    MPV 10.6 8.9 - 12.9 FL    NRBC 0.0 0  WBC    ABSOLUTE NRBC 0.00 0.00 - 0.01 K/uL    NEUTROPHILS 60 32 - 75 %    LYMPHOCYTES 29 12 - 49 %    MONOCYTES 9 5 - 13 %    EOSINOPHILS 1 0 - 7 %    BASOPHILS 1 0 - 1 %    IMMATURE GRANULOCYTES 0 0.0 - 0.5 %    ABS. NEUTROPHILS 3.2 1.8 - 8.0 K/UL    ABS. LYMPHOCYTES 1.6 0.8 - 3.5 K/UL    ABS. MONOCYTES 0.5 0.0 - 1.0 K/UL    ABS. EOSINOPHILS 0.1 0.0 - 0.4 K/UL    ABS. BASOPHILS 0.0 0.0 - 0.1 K/UL    ABS. IMM. GRANS. 0.0 0.00 - 0.04 K/UL           RECOMMENDATIONS:     · Your labs show you have an under active thyroid. Need to take a thyroid hormone tablet to replace missing hormone. A prescription was sent to your pharmacy. Take this medication every morning before breakfast with just water.  Wait 30 minutes before eating or taking other medication. Follow up in 2-3 months for repeat blood test.   · Also your Sed rate is HIGH which is consistent with flare up of your Rheumatoid Arthritis. Please follow up with rheumatology. · Labs show NORMAL Vitamin D, blood cell counts, blood sugar, liver and kidney functions. · Please share this information with your specialists at [facility].     Please call me if you have any questions: [phone]    Sincerely,      Chasidy Moran MD

## 2021-07-12 NOTE — TELEPHONE ENCOUNTER
----- Message from Rishabh Zapien sent at 7/12/2021  2:24 PM EDT -----  Regarding: Dr. Marisa Malloy  General Message/Vendor Calls    Caller's first and last name: Mika/      Reason for call: Pt seen 6/28/21 and lab results were to be mailed to pt. Pt has not received, can these please be mailed to pt or if possible can email to [email or web address].       Callback required yes/no and why: yes      Best contact number(s): [phone]      Details to clarify the request: n/a       Rishabh Zapien

## 2021-08-17 ENCOUNTER — TELEPHONE (OUTPATIENT)
Dept: FAMILY MEDICINE CLINIC | Age: 56
End: 2021-08-17

## 2021-08-17 DIAGNOSIS — E03.9 ACQUIRED HYPOTHYROIDISM: Primary | ICD-10-CM

## 2021-08-17 NOTE — TELEPHONE ENCOUNTER
Patients spouse called and stated patient started the Levothyroxine but is now losing a lot of weight,Would like to see if she should stop taking this. She is now under 100 lbs.  x)[phone]

## 2021-08-18 NOTE — TELEPHONE ENCOUNTER
Please call Mika. Plan to recheck thyroid labs. Is there a [facility] close to them where we can fax lab orders? Or does she want to be seen here?

## 2021-08-18 NOTE — TELEPHONE ENCOUNTER
Dr. Villareal Expose, they would like to have these labs done today. They are going to go to the [facility]. Please place order and I will fax.

## 2021-08-19 ENCOUNTER — TELEPHONE (OUTPATIENT)
Dept: FAMILY MEDICINE CLINIC | Age: 56
End: 2021-08-19

## 2021-08-19 LAB
T4 FREE SERPL-MCNC: 1.33 NG/DL (ref 0.82–1.77)
TSH SERPL DL<=0.005 MIU/L-ACNC: 2.47 UIU/ML (ref 0.45–4.5)

## 2021-08-20 NOTE — TELEPHONE ENCOUNTER
I spoke with Marce Cousins about results. He said pt is suffering form her arthritis. Started back on hydroxychloroquine yesterday. I don't think wt loss is due to thyroid med. Continue current dose.

## 2021-11-12 ENCOUNTER — TELEPHONE (OUTPATIENT)
Dept: FAMILY MEDICINE CLINIC | Age: 56
End: 2021-11-12

## 2021-11-12 NOTE — TELEPHONE ENCOUNTER
I would like to see pt in office and get thyroid labs checked. Please call to schedule appt. She does not need to fast for this appt.

## 2021-11-12 NOTE — TELEPHONE ENCOUNTER
----- Message from [address] [place] [address] sent at 11/12/2021 10:41 AM EST -----  Subject: Message to Provider    QUESTIONS  Information for Provider? Patients called in wanting to know if she needs   to get labs completed for thyroid check and other things. Patient also   needs to know when she needs to be seen next. Requesting a call back at   [phone] messages okay   ---------------------------------------------------------------------------  --------------  CALL BACK INFO  What is the best way for the office to contact you? OK to leave message on   voicemail  Preferred Call Back Phone Number? [phone]  ---------------------------------------------------------------------------  --------------  SCRIPT ANSWERS  Relationship to Patient?  Self

## 2021-11-19 ENCOUNTER — OFFICE VISIT (OUTPATIENT)
Dept: FAMILY MEDICINE CLINIC | Age: 56
End: 2021-11-19
Payer: COMMERCIAL

## 2021-11-19 VITALS
TEMPERATURE: 97.9 F | DIASTOLIC BLOOD PRESSURE: 60 MMHG | WEIGHT: 111.4 LBS | SYSTOLIC BLOOD PRESSURE: 95 MMHG | BODY MASS INDEX: 20.5 KG/M2 | HEART RATE: 76 BPM | OXYGEN SATURATION: 99 % | HEIGHT: 62 IN

## 2021-11-19 DIAGNOSIS — K90.41 GLUTEN INTOLERANCE: ICD-10-CM

## 2021-11-19 DIAGNOSIS — E55.9 VITAMIN D DEFICIENCY: ICD-10-CM

## 2021-11-19 DIAGNOSIS — E03.9 ACQUIRED HYPOTHYROIDISM: Primary | ICD-10-CM

## 2021-11-19 DIAGNOSIS — M06.9 RHEUMATOID ARTHRITIS, INVOLVING UNSPECIFIED SITE, UNSPECIFIED WHETHER RHEUMATOID FACTOR PRESENT (HCC): ICD-10-CM

## 2021-11-19 DIAGNOSIS — Z79.899 ENCOUNTER FOR LONG-TERM CURRENT USE OF MEDICATION: ICD-10-CM

## 2021-11-19 LAB
COMMENT, HOLDF: NORMAL
SAMPLES BEING HELD,HOLD: NORMAL

## 2021-11-19 PROCEDURE — 99214 OFFICE O/P EST MOD 30 MIN: CPT | Performed by: FAMILY MEDICINE

## 2021-11-19 RX ORDER — HYDROXYCHLOROQUINE SULFATE 200 MG/1
400 TABLET, FILM COATED ORAL DAILY
COMMUNITY

## 2021-11-19 NOTE — PROGRESS NOTES
Chief Complaint   Patient presents with    Labs    Wrist Pain     right    Other     stiffness in fingers     Visit Vitals  BP 95/60 (BP 1 Location: Left arm, BP Patient Position: Sitting, BP Cuff Size: Small adult)   Pulse 76   Temp 97.9 °F (36.6 °C) (Oral)   Ht 5' 1.5\" (1.562 m)   Wt 111 lb 6.4 oz (50.5 kg)   SpO2 99%   BMI 20.71 kg/m²     1. Have you been to the ER, urgent care clinic since your last visit? Hospitalized since your last visit?no    2. Have you seen or consulted any other health care providers outside of the [address] [place] [address] since your last visit? Include any pap smears or colon screening.  [address] Oncology and Rheumatology

## 2021-11-19 NOTE — PATIENT INSTRUCTIONS
Rheumatoid Arthritis (RA) Diet: Care Instructions  Your Care Instructions     The best diet for people with rheumatoid arthritis is a healthy, balanced diet. This is one that is low in saturated fat and salt and high in fiber and complex carbohydrate (whole grains, beans, fruits, and vegetables). Fish oil (omega-3 fatty acids) has a modest effect in reducing inflammation, and eating fish may improve symptoms. People who have rheumatoid arthritis have a high risk of developing osteoporosis. To help prevent this disease, get plenty of calcium and vitamin D. Follow-up care is a key part of your treatment and safety. Be sure to make and go to all appointments, and call your doctor if you are having problems. It's also a good idea to know your test results and keep a list of the medicines you take. How can you care for yourself at home? · Try to eat at least 2 servings of fish each week. Oily fish, which contain omega-3 fatty acids, include:  ? [place]. ? [place]. ? Mackerel. ? Lake trout. ? Herring. ? Sardines. · If you're pregnant, talk to your doctor about eating fish. Pregnant women shouldn't eat certain types of fish that have high mercury content. · You can get calcium and vitamin D by drinking milk fortified with vitamin D. Four glasses of milk a day provide about 1,200 milligrams (mg) of calcium. Other common foods with calcium:  ? Yogurt (plain or low-fat). An 8-ounce serving provides 415 mg of calcium. ? Cheddar cheese. A 1½-ounce serving provides 306 mg.  ? Milk (skim, 2%, or whole). A 1-cup serving provides about 300 mg.  ? Cottage cheese (1% milk fat). A 1-cup serving provides 138 mg.  · If you can't eat or drink dairy foods, you can get calcium and vitamin D from:  ? Calcium-fortified orange juice. A 1-cup serving provides 500 mg of calcium. ? Calcium-enriched soy milk. A 1-cup serving provides 282 mg of calcium. ? Almonds.  A 1-ounce serving (about 24 nuts) provides 75 mg of calcium. ? Canned salmon. A 3-ounce serving provides 180 mg of calcium. ? Tofu (firm, made with calcium sulfate). A ½-cup serving provides 204 mg. · You may need to take a calcium supplement to make sure you are getting the calcium you need. Where can you learn more? Go to http://www.gray.com/  Enter Q201 in the search box to learn more about \"Rheumatoid Arthritis (RA) Diet: Care Instructions. \"  Current as of: December 17, 2020               Content Version: 13.0  © 8006-0178 [employer]. Care instructions adapted under license by [employer] (which disclaims liability or warranty for this information). If you have questions about a medical condition or this instruction, always ask your healthcare professional. [address] any warranty or liability for your use of this information.

## 2021-11-20 LAB
25(OH)D3 SERPL-MCNC: 56.3 NG/ML (ref 30–100)
ALBUMIN SERPL-MCNC: 3.7 G/DL (ref 3.5–5)
ALBUMIN/GLOB SERPL: 0.9 {RATIO} (ref 1.1–2.2)
ALP SERPL-CCNC: 68 U/L (ref 45–117)
ALT SERPL-CCNC: 25 U/L (ref 12–78)
ANION GAP SERPL CALC-SCNC: 7 MMOL/L (ref 5–15)
AST SERPL-CCNC: 15 U/L (ref 15–37)
BASOPHILS # BLD: 0 K/UL (ref 0–0.1)
BASOPHILS NFR BLD: 1 % (ref 0–1)
BILIRUB SERPL-MCNC: 0.3 MG/DL (ref 0.2–1)
BUN SERPL-MCNC: 14 MG/DL (ref 6–20)
BUN/CREAT SERPL: 20 (ref 12–20)
CALCIUM SERPL-MCNC: 9.4 MG/DL (ref 8.5–10.1)
CHLORIDE SERPL-SCNC: 104 MMOL/L (ref 97–108)
CO2 SERPL-SCNC: 26 MMOL/L (ref 21–32)
CREAT SERPL-MCNC: 0.71 MG/DL (ref 0.55–1.02)
DIFFERENTIAL METHOD BLD: NORMAL
EOSINOPHIL # BLD: 0.1 K/UL (ref 0–0.4)
EOSINOPHIL NFR BLD: 1 % (ref 0–7)
ERYTHROCYTE [DISTWIDTH] IN BLOOD BY AUTOMATED COUNT: 14.1 % (ref 11.5–14.5)
GLOBULIN SER CALC-MCNC: 4.2 G/DL (ref 2–4)
GLUCOSE SERPL-MCNC: 105 MG/DL (ref 65–100)
HCT VFR BLD AUTO: 36.1 % (ref 35–47)
HGB BLD-MCNC: 11.6 G/DL (ref 11.5–16)
IMM GRANULOCYTES # BLD AUTO: 0 K/UL (ref 0–0.04)
IMM GRANULOCYTES NFR BLD AUTO: 0 % (ref 0–0.5)
LYMPHOCYTES # BLD: 1.4 K/UL (ref 0.8–3.5)
LYMPHOCYTES NFR BLD: 19 % (ref 12–49)
MAGNESIUM SERPL-MCNC: 1.9 MG/DL (ref 1.6–2.4)
MCH RBC QN AUTO: 27 PG (ref 26–34)
MCHC RBC AUTO-ENTMCNC: 32.1 G/DL (ref 30–36.5)
MCV RBC AUTO: 84.1 FL (ref 80–99)
MONOCYTES # BLD: 0.6 K/UL (ref 0–1)
MONOCYTES NFR BLD: 8 % (ref 5–13)
NEUTS SEG # BLD: 5.1 K/UL (ref 1.8–8)
NEUTS SEG NFR BLD: 71 % (ref 32–75)
NRBC # BLD: 0 K/UL (ref 0–0.01)
NRBC BLD-RTO: 0 PER 100 WBC
PLATELET # BLD AUTO: 236 K/UL (ref 150–400)
PMV BLD AUTO: 10.8 FL (ref 8.9–12.9)
POTASSIUM SERPL-SCNC: 3.6 MMOL/L (ref 3.5–5.1)
PROT SERPL-MCNC: 7.9 G/DL (ref 6.4–8.2)
RBC # BLD AUTO: 4.29 M/UL (ref 3.8–5.2)
SODIUM SERPL-SCNC: 137 MMOL/L (ref 136–145)
T4 FREE SERPL-MCNC: 1 NG/DL (ref 0.8–1.5)
TSH SERPL DL<=0.05 MIU/L-ACNC: 4.4 UIU/ML (ref 0.36–3.74)
WBC # BLD AUTO: 7.2 K/UL (ref 3.6–11)

## 2021-11-21 ENCOUNTER — TELEPHONE (OUTPATIENT)
Dept: FAMILY MEDICINE CLINIC | Age: 56
End: 2021-11-21

## 2021-11-22 LAB
GLIADIN PEPTIDE IGA SER-ACNC: 4 UNITS (ref 0–19)
GLIADIN PEPTIDE IGG SER-ACNC: 2 UNITS (ref 0–19)
IGA SERPL-MCNC: 222 MG/DL (ref 87–352)
TTG IGA SER-ACNC: <2 U/ML (ref 0–3)
TTG IGG SER-ACNC: 3 U/ML (ref 0–5)

## 2021-11-22 NOTE — TELEPHONE ENCOUNTER
[facility] was able to get appt for January 2022. [Takes medication as prescribed] : takes [None] : Patient does not have any barriers to medication adherence

## 2021-11-28 NOTE — PROGRESS NOTES
Please call pt's  Johnathan Gómez. Negative test for celiac disease. Normal magnesium and Vit D level. Normal blood cell counts. Thyroid level is fine. Continue on current dose of Levothyroxine. Normal kidney and liver function tests.

## 2021-11-29 ENCOUNTER — TELEPHONE (OUTPATIENT)
Dept: FAMILY MEDICINE CLINIC | Age: 56
End: 2021-11-29

## 2021-11-29 DIAGNOSIS — M05.79 RHEUMATOID ARTHRITIS INVOLVING MULTIPLE SITES WITH POSITIVE RHEUMATOID FACTOR (HCC): ICD-10-CM

## 2021-11-29 DIAGNOSIS — M79.642 PAIN IN BOTH HANDS: Primary | ICD-10-CM

## 2021-11-29 DIAGNOSIS — M79.641 PAIN IN BOTH HANDS: Primary | ICD-10-CM

## 2021-11-29 NOTE — TELEPHONE ENCOUNTER
Spoke to pt's  and pt would like x-ray of her hands do to increased pain and weakness. They would also like to discuss medication options once x-ray results come back.

## 2021-11-29 NOTE — TELEPHONE ENCOUNTER
Pt's  wants to discuss the rheumatoid arthritis in her hands and see if it was possible for her to get an x-ray.

## 2021-11-29 NOTE — TELEPHONE ENCOUNTER
I spoke with pt and her . Will order XR both hands and wrists. She will go to [facility]. FU with her RHEUM with XR's.

## 2021-11-29 NOTE — TELEPHONE ENCOUNTER
----- Message from Rufina Key MD sent at 11/28/2021  4:04 PM EST -----  Please call pt's  Carlos Mensah. Negative test for celiac disease. Normal magnesium and Vit D level. Normal blood cell counts. Thyroid level is fine. Continue on current dose of Levothyroxine. Normal kidney and liver function tests.

## 2021-11-30 ENCOUNTER — HOSPITAL ENCOUNTER (OUTPATIENT)
Dept: GENERAL RADIOLOGY | Age: 56
Discharge: HOME | End: 2021-11-30
Attending: FAMILY MEDICINE
Payer: COMMERCIAL

## 2021-11-30 DIAGNOSIS — M05.79 RHEUMATOID ARTHRITIS INVOLVING MULTIPLE SITES WITH POSITIVE RHEUMATOID FACTOR (HCC): ICD-10-CM

## 2021-11-30 DIAGNOSIS — M79.642 PAIN IN BOTH HANDS: ICD-10-CM

## 2021-11-30 DIAGNOSIS — M79.641 PAIN IN BOTH HANDS: ICD-10-CM

## 2021-11-30 PROCEDURE — 73100 X-RAY EXAM OF WRIST: CPT

## 2021-11-30 PROCEDURE — 73120 X-RAY EXAM OF HAND: CPT

## 2021-11-30 RX ORDER — LEVOTHYROXINE SODIUM 50 UG/1
TABLET ORAL
Qty: 90 TABLET | Refills: 1 | Status: SHIPPED | OUTPATIENT
Start: 2021-11-30 | End: 2022-05-26

## 2021-12-01 ENCOUNTER — TELEPHONE (OUTPATIENT)
Dept: FAMILY MEDICINE CLINIC | Age: 56
End: 2021-12-01

## 2021-12-01 DIAGNOSIS — M05.79 RHEUMATOID ARTHRITIS INVOLVING MULTIPLE SITES WITH POSITIVE RHEUMATOID FACTOR (HCC): ICD-10-CM

## 2021-12-01 DIAGNOSIS — M79.642 PAIN IN BOTH HANDS: Primary | ICD-10-CM

## 2021-12-01 DIAGNOSIS — M79.641 PAIN IN BOTH HANDS: Primary | ICD-10-CM

## 2021-12-01 NOTE — TELEPHONE ENCOUNTER
Please call pt's  Shena Barnes to discuss XR results. XR of R hand shows old injury base of thumb and L 4 th finger old injury. No sign of RA on hand XR.   I recommend she see Hand Ortho specialist. I will put in referral.

## 2022-02-07 ENCOUNTER — TELEPHONE (OUTPATIENT)
Dept: FAMILY MEDICINE CLINIC | Age: 57
End: 2022-02-07

## 2022-02-07 DIAGNOSIS — M54.2 NECK PAIN: Primary | ICD-10-CM

## 2022-02-07 NOTE — TELEPHONE ENCOUNTER
I placed referral for PT at [address] [place] [address]. She can call for appt. I don't know which location is closest to her. May need to fax referral to [address] [place] [address].

## 2022-02-07 NOTE — TELEPHONE ENCOUNTER
Spoke to patient's  gave referral information to [facility]. Faxed referral to the [facility] location.

## 2022-02-07 NOTE — TELEPHONE ENCOUNTER
----- Message from Courtney Calderon sent at 2/7/2022 10:11 AM EST -----  Subject: Referral Request    QUESTIONS   Reason for referral request? referral for PT for neck   Has the physician seen you for this condition before? No   Preferred Specialist (if applicable)? Do you already have an appointment scheduled? No  Additional Information for Provider? Patient would like to go to   [facility] for PT for her neck. Please call patient back and advise.  ---------------------------------------------------------------------------  --------------  CALL BACK INFO  What is the best way for the office to contact you? OK to leave message on   voicemail  Preferred Call Back Phone Number?  [phone]

## 2022-03-18 PROBLEM — K90.41 GLUTEN INTOLERANCE: Status: ACTIVE | Noted: 2019-02-11

## 2022-03-19 PROBLEM — C11.9 NASOPHARYNX CANCER (HCC): Status: ACTIVE | Noted: 2021-06-21

## 2022-03-20 PROBLEM — E03.9 ACQUIRED HYPOTHYROIDISM: Status: ACTIVE | Noted: 2021-06-27

## 2022-05-10 ENCOUNTER — PATIENT MESSAGE (OUTPATIENT)
Dept: FAMILY MEDICINE CLINIC | Age: 57
End: 2022-05-10

## 2022-05-26 RX ORDER — LEVOTHYROXINE SODIUM 50 UG/1
TABLET ORAL
Qty: 90 TABLET | Refills: 1 | Status: SHIPPED | OUTPATIENT
Start: 2022-05-26

## 2022-08-02 ENCOUNTER — PATIENT MESSAGE (OUTPATIENT)
Dept: FAMILY MEDICINE CLINIC | Age: 57
End: 2022-08-02

## 2022-11-02 ENCOUNTER — VIRTUAL VISIT (OUTPATIENT)
Dept: FAMILY MEDICINE CLINIC | Age: 57
End: 2022-11-02
Payer: COMMERCIAL

## 2022-11-02 DIAGNOSIS — Z79.899 ENCOUNTER FOR LONG-TERM CURRENT USE OF MEDICATION: ICD-10-CM

## 2022-11-02 DIAGNOSIS — R94.8 ABNORMAL POSITRON EMISSION TOMOGRAPHY (PET) SCAN: Primary | ICD-10-CM

## 2022-11-02 DIAGNOSIS — E03.9 ACQUIRED HYPOTHYROIDISM: ICD-10-CM

## 2022-11-02 DIAGNOSIS — Z12.31 ENCOUNTER FOR SCREENING MAMMOGRAM FOR MALIGNANT NEOPLASM OF BREAST: ICD-10-CM

## 2022-11-02 DIAGNOSIS — E55.9 VITAMIN D DEFICIENCY: ICD-10-CM

## 2022-11-02 DIAGNOSIS — M05.79 RHEUMATOID ARTHRITIS INVOLVING MULTIPLE SITES WITH POSITIVE RHEUMATOID FACTOR (HCC): ICD-10-CM

## 2022-11-02 PROCEDURE — 99214 OFFICE O/P EST MOD 30 MIN: CPT | Performed by: FAMILY MEDICINE

## 2022-11-03 NOTE — PROGRESS NOTES
Sushila Velázquez is a 62 y.o. female who was seen by synchronous (real-time) audio-video technology on 11/2/2022 for Results, Hypothyroidism, and URI        Assessment & Plan:   Diagnoses and all orders for this visit:    1. Abnormal positron emission tomography (PET) scan  -     US BREAST AXILLA RT; Future    2. Encounter for screening mammogram for malignant neoplasm of breast  -     VERONICA MAMMO BI SCREENING INCL CAD; Future    3. Acquired hypothyroidism  -     LIPID PANEL; Future  -     TSH 3RD GENERATION; Future  -     T4, FREE; Future    4. Rheumatoid arthritis involving multiple sites with positive rheumatoid factor ([facility]    5. Vitamin D deficiency  -     VITAMIN D, 25 HYDROXY; Future    6. Encounter for long-term current use of medication  -     METABOLIC PANEL, BASIC; Future    Order mammogram and R axilla US. Order labs. Reschedule CPE    I spent at least 15 minutes on this visit with this established patient. [id number]  Subjective:       Prior to Admission medications    Medication Sig Start Date End Date Taking? Authorizing Provider   levothyroxine (SYNTHROID) 50 mcg tablet TAKE 1 TABLET BY MOUTH EVERY DAY BEFORE BREAKFAST 2/55/14  Yes Isauro Fung MD   hydrOXYchloroQUINE (PLAQUENIL) 200 mg tablet Take 400 mg by mouth daily. Yes Provider, Historical   pilocarpine (SALAGEN) 5 mg tablet TAKE 1 TABLET BY MOUTH THREE TIMES A DAY 6/7/21  Yes Provider, Historical   ascorbic acid, vitamin C, (VITAMIN C) 500 mg tablet Take 500 mg by mouth daily. Yes Provider, Historical   multivitamin (ONE A DAY) tablet Take 1 Tab by mouth daily. Yes Provider, Historical   cholecalciferol, vitamin D3, 50 mcg (2,000 unit) tab Take 4,000 Units by mouth daily. takes sometimes, 0 Refills 7/28/15  Yes Provider, Historical   B.animalis,bifid,infantis,long 10-15 mg TbEC Take 1 Tab by mouth daily. Yes Provider, Historical   FISH  mg cap Take 1 Tab by mouth daily.    Yes Provider, Historical   calcium 500 mg tab Take 1 Tab by mouth.   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 pilocarpine (SALAGEN) 5 mg tablet TAKE 1 TABLET BY MOUTH THREE TIMES A DAY      ascorbic acid, vitamin C, (VITAMIN C) 500 mg tablet Take 500 mg by mouth daily. multivitamin (ONE A DAY) tablet Take 1 Tab by mouth daily. cholecalciferol, vitamin D3, 50 mcg (2,000 unit) tab Take 4,000 Units by mouth daily. takes sometimes, 0 Refills      B.animalis,bifid,infantis,long 10-15 mg TbEC Take 1 Tab by mouth daily. FISH  mg cap Take 1 Tab by mouth daily.       calcium 500 mg tab Take 1 Tab by mouth.      polyethylene glycol (MIRALAX) 17 gram/dose powder  g, PO, daily, 0 Refills       Allergies   Allergen Reactions    Biaxin [Clarithromycin] Shortness of Breath and Swelling    Adhesive Tape-Silicones Rash    Ethanol (Ethyl Alcohol) Rash     Cannot digest    Tobradex [Tobramycin-Dexamethasone] Other (comments)     REDNESS IN EYE    Tormentil Other (comments)     Redness - PATIENT DENIES ALLERGY AS OF 2/11/2020     Past Medical History:   Diagnosis Date    Bursitis/tendonitis, shoulder     Gastroparesis     GERD (gastroesophageal reflux disease)     Neck mass     Rheumatoid arthritis (HCC)      Past Surgical History:   Procedure Laterality Date    HX COLONOSCOPY      HX ENDOSCOPY      HX LYMPH NODE DISSECTION Left 02/2020    HX OTHER SURGICAL  2008    right knuckle surgery to remove janet flores    HX TUMOR REMOVAL  02/2020    Nasopharygeal       ROS  Pt was scheduled for CPE but arrived with acute URI illness with fever. Switched to telehealth visit to discuss concerns about PET scan done @ [facility] 10/13/22. Oncology Dr Chico Higgins reviewed results with pt and recommended she FU with PCP. 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 PET scan showed uptake R axillary nodes. Pt has not had mammogram since 2019 because she has been getting PET scans instead. Now request mammogram and US R axilla. She has had URI sx since Sunday. Her grandchildren have been sick. Congestion, low grade fever. Objective:     Patient-Reported Vitals 11/2/2022   Patient-Reported Temperature 99.5      General: alert, cooperative, no distress   Mental  status: normal mood, behavior, speech, dress, motor activity, and thought processes, able to follow commands   HENT: NCAT   Neck: no visualized mass   Resp: no respiratory distress   Neuro: no gross deficits   Skin: no discoloration or lesions of concern on visible areas   Psychiatric: normal affect, consistent with stated mood, no evidence of hallucinations     Additional exam findings: We discussed the expected course, resolution and complications of the diagnosis(es) in detail. Medication risks, benefits, costs, interactions, and alternatives were discussed as indicated. I advised her to contact the office if her condition worsens, changes or fails to improve as anticipated. She expressed understanding with the diagnosis(es) and plan. Peter Duong, was evaluated through a synchronous (real-time) audio-video encounter. The patient (or guardian if applicable) is aware that this is a billable service, which includes applicable co-pays. This Virtual Visit was conducted with patient's (and/or legal guardian's) consent.  The visit was conducted pursuant to the emergency declaration under the [facility], [address] [place] [address] waiver authority and the [employer] Act. Patient identification was verified, and a caregiver was present when appropriate. The patient was located at: Other: car  The provider was located at:  Facility (Appt Department): [address] [place] [address]        Rui Mercer MD

## 2022-11-17 RX ORDER — LEVOTHYROXINE SODIUM 50 UG/1
50 TABLET ORAL
Qty: 90 TABLET | Refills: 0 | Status: SHIPPED | OUTPATIENT
Start: 2022-11-17

## 2022-11-28 NOTE — PROGRESS NOTES
HISTORY OF PRESENT ILLNESS  Estephania George is a 64 y.o. female. HPI  FU chronic conditions:  Hypothyroidism, RA. She needs repeat labs. Pt has flare up of RA in R wrist. Has just finished course of prednisone per [facility] Dr Koby Solano. She is taking Hydroxychloroquine 400 mg daily. She does not want to take any biologics as they might trigger her cancer to recur. She has stopped working at [employer] and is applying for Disability. Pt wishes to see Allergist to discuss possible food allergies that might affect her arthritis. ROS  Visit Vitals  BP 95/60 (BP 1 Location: Left arm, BP Patient Position: Sitting, BP Cuff Size: Small adult)   Pulse 76   Temp 97.9 °F (36.6 °C) (Oral)   Ht 5' 1.5\" (1.562 m)   Wt 111 lb 6.4 oz (50.5 kg)   LMP 02/12/2015   SpO2 99%   BMI 20.71 kg/m²       Physical Exam  Vitals reviewed. Constitutional:       Appearance: Normal appearance. Cardiovascular:      Rate and Rhythm: Normal rate and regular rhythm. Heart sounds: Normal heart sounds. Pulmonary:      Effort: Pulmonary effort is normal.      Breath sounds: Normal breath sounds. Musculoskeletal:      Right wrist: Swelling and tenderness present. Right hand: Deformity present. Left hand: Deformity present. Neurological:      Mental Status: She is alert. ASSESSMENT and PLAN    ICD-10-CM ICD-9-CM    1. Acquired hypothyroidism  E03.9 244.9 TSH 3RD GENERATION      T4, FREE      T4, FREE      TSH 3RD GENERATION   2. Encounter for long-term current use of medication  A21.110 R84.10 METABOLIC PANEL, COMPREHENSIVE      CBC WITH AUTOMATED DIFF      MAGNESIUM      MAGNESIUM      CBC WITH AUTOMATED DIFF      METABOLIC PANEL, COMPREHENSIVE   3. Gluten intolerance  K90.41 579.0 CELIAC ANTIBODY PROFILE      CELIAC ANTIBODY PROFILE   4. Vitamin D deficiency  E55.9 268.9 VITAMIN D, 25 HYDROXY      VITAMIN D, 25 HYDROXY   5.  Rheumatoid arthritis, involving unspecified site, unspecified whether rheumatoid factor present ([facility]  M06.9 714.0 REFERRAL TO ALLERGY Consent (Nose)/Introductory Paragraph: The rationale for Mohs was explained to the patient and consent was obtained. The risks, benefits and alternatives to therapy were discussed in detail. Specifically, the risks of nasal deformity, changes in the flow of air through the nose, infection, scarring, bleeding, prolonged wound healing, incomplete removal, allergy to anesthesia, nerve injury and recurrence were addressed. Prior to the procedure, the treatment site was clearly identified and confirmed by the patient using a hand mirror. All components of Universal Protocol/PAUSE Rule completed.

## 2022-12-12 DIAGNOSIS — E03.9 ACQUIRED HYPOTHYROIDISM: ICD-10-CM

## 2022-12-12 DIAGNOSIS — E55.9 VITAMIN D DEFICIENCY: ICD-10-CM

## 2022-12-12 DIAGNOSIS — Z79.899 ENCOUNTER FOR LONG-TERM CURRENT USE OF MEDICATION: ICD-10-CM

## 2022-12-12 LAB
25(OH)D3 SERPL-MCNC: 53.6 NG/ML (ref 30–100)
ANION GAP SERPL CALC-SCNC: 6 MMOL/L (ref 5–15)
BUN SERPL-MCNC: 7 MG/DL (ref 6–20)
BUN/CREAT SERPL: 13 (ref 12–20)
CALCIUM SERPL-MCNC: 9.4 MG/DL (ref 8.5–10.1)
CHLORIDE SERPL-SCNC: 103 MMOL/L (ref 97–108)
CHOLEST SERPL-MCNC: 228 MG/DL
CO2 SERPL-SCNC: 29 MMOL/L (ref 21–32)
CREAT SERPL-MCNC: 0.54 MG/DL (ref 0.55–1.02)
GLUCOSE SERPL-MCNC: 82 MG/DL (ref 65–100)
HDLC SERPL-MCNC: 93 MG/DL
HDLC SERPL: 2.5 {RATIO} (ref 0–5)
LDLC SERPL CALC-MCNC: 124.2 MG/DL (ref 0–100)
POTASSIUM SERPL-SCNC: 3.9 MMOL/L (ref 3.5–5.1)
SODIUM SERPL-SCNC: 138 MMOL/L (ref 136–145)
T4 FREE SERPL-MCNC: 1 NG/DL (ref 0.8–1.5)
TRIGL SERPL-MCNC: 54 MG/DL (ref ?–150)
TSH SERPL DL<=0.05 MIU/L-ACNC: 1.93 UIU/ML (ref 0.36–3.74)
VLDLC SERPL CALC-MCNC: 10.8 MG/DL

## 2022-12-14 ENCOUNTER — HOSPITAL ENCOUNTER (OUTPATIENT)
Dept: MAMMOGRAPHY | Age: 57
Discharge: HOME | End: 2022-12-14
Attending: FAMILY MEDICINE

## 2022-12-14 DIAGNOSIS — R94.8 ABNORMAL POSITRON EMISSION TOMOGRAPHY (PET) SCAN: ICD-10-CM

## 2022-12-14 DIAGNOSIS — Z12.31 ENCOUNTER FOR SCREENING MAMMOGRAM FOR MALIGNANT NEOPLASM OF BREAST: ICD-10-CM

## 2022-12-14 NOTE — PROGRESS NOTES
Good Vit D level. Thyroid level is fine. Cholesterol numbers are higher. Follow low fat diet. Normal electrolytes and kidney function and sugar.

## 2022-12-15 ENCOUNTER — TELEPHONE (OUTPATIENT)
Dept: FAMILY MEDICINE CLINIC | Age: 57
End: 2022-12-15

## 2022-12-15 NOTE — TELEPHONE ENCOUNTER
----- Message from Luan Ball MD sent at 12/14/2022  5:04 PM EST -----  Good Vit D level. Thyroid level is fine. Cholesterol numbers are higher. Follow low fat diet. Normal electrolytes and kidney function and sugar.

## 2023-01-16 ENCOUNTER — HOSPITAL ENCOUNTER (OUTPATIENT)
Dept: MAMMOGRAPHY | Age: 58
Discharge: HOME | End: 2023-01-16
Attending: FAMILY MEDICINE
Payer: COMMERCIAL

## 2023-01-16 ENCOUNTER — TRANSCRIBE ORDER (OUTPATIENT)
Dept: REGISTRATION | Age: 58
End: 2023-01-16

## 2023-01-16 DIAGNOSIS — R94.8 ABNORMAL POSITRON EMISSION TOMOGRAPHY (PET) SCAN: ICD-10-CM

## 2023-01-16 PROCEDURE — 77062 BREAST TOMOSYNTHESIS BI: CPT

## 2023-01-16 PROCEDURE — 76882 US LMTD JT/FCL EVL NVASC XTR: CPT

## 2023-02-12 RX ORDER — LEVOTHYROXINE SODIUM 50 UG/1
50 TABLET ORAL
Qty: 90 TABLET | Refills: 3 | Status: SHIPPED | OUTPATIENT
Start: 2023-02-12

## 2023-05-05 ENCOUNTER — OFFICE VISIT (OUTPATIENT)
Dept: URGENT CARE | Age: 58
End: 2023-05-05

## 2023-05-05 VITALS
HEART RATE: 75 BPM | HEIGHT: 62 IN | TEMPERATURE: 97.6 F | WEIGHT: 115.3 LBS | OXYGEN SATURATION: 98 % | BODY MASS INDEX: 21.22 KG/M2 | SYSTOLIC BLOOD PRESSURE: 120 MMHG | DIASTOLIC BLOOD PRESSURE: 81 MMHG

## 2023-05-05 DIAGNOSIS — H66.90 ACUTE OTITIS MEDIA, UNSPECIFIED OTITIS MEDIA TYPE: Primary | ICD-10-CM

## 2023-05-05 RX ORDER — METHOTREXATE 2.5 MG/1
TABLET ORAL
COMMUNITY

## 2023-05-05 RX ORDER — CEFDINIR 300 MG/1
300 CAPSULE ORAL 2 TIMES DAILY
Qty: 20 CAPSULE | Refills: 0 | Status: SHIPPED | OUTPATIENT
Start: 2023-05-05 | End: 2023-05-15

## 2023-06-09 ENCOUNTER — NURSE ONLY (OUTPATIENT)
Age: 58
End: 2023-06-09

## 2023-06-09 ENCOUNTER — OFFICE VISIT (OUTPATIENT)
Age: 58
End: 2023-06-09
Payer: COMMERCIAL

## 2023-06-09 VITALS
HEART RATE: 81 BPM | OXYGEN SATURATION: 98 % | RESPIRATION RATE: 18 BRPM | SYSTOLIC BLOOD PRESSURE: 110 MMHG | BODY MASS INDEX: 20.61 KG/M2 | DIASTOLIC BLOOD PRESSURE: 72 MMHG | WEIGHT: 112 LBS | TEMPERATURE: 99.6 F | HEIGHT: 62 IN

## 2023-06-09 DIAGNOSIS — E55.9 VITAMIN D DEFICIENCY, UNSPECIFIED: ICD-10-CM

## 2023-06-09 DIAGNOSIS — Z00.00 ENCOUNTER FOR WELL ADULT EXAM WITHOUT ABNORMAL FINDINGS: ICD-10-CM

## 2023-06-09 DIAGNOSIS — E53.8 VITAMIN B12 DEFICIENCY: ICD-10-CM

## 2023-06-09 DIAGNOSIS — Z12.11 SCREEN FOR COLON CANCER: ICD-10-CM

## 2023-06-09 DIAGNOSIS — Z00.00 ENCOUNTER FOR WELL ADULT EXAM WITHOUT ABNORMAL FINDINGS: Primary | ICD-10-CM

## 2023-06-09 DIAGNOSIS — Z12.4 PAP SMEAR FOR CERVICAL CANCER SCREENING: ICD-10-CM

## 2023-06-09 DIAGNOSIS — Z23 NEED FOR SHINGLES VACCINE: ICD-10-CM

## 2023-06-09 PROCEDURE — 99396 PREV VISIT EST AGE 40-64: CPT | Performed by: FAMILY MEDICINE

## 2023-06-09 RX ORDER — M-VIT,TX,IRON,MINS/CALC/FOLIC 27MG-0.4MG
1 TABLET ORAL DAILY
COMMUNITY

## 2023-06-09 RX ORDER — ZOSTER VACCINE RECOMBINANT, ADJUVANTED 50 MCG/0.5
0.5 KIT INTRAMUSCULAR ONCE
Qty: 1 EACH | Refills: 1 | Status: SHIPPED | OUTPATIENT
Start: 2023-06-09 | End: 2023-06-09

## 2023-06-09 RX ORDER — FOLIC ACID 1 MG/1
2 TABLET ORAL DAILY
COMMUNITY
Start: 2023-04-03

## 2023-06-09 SDOH — ECONOMIC STABILITY: FOOD INSECURITY: WITHIN THE PAST 12 MONTHS, YOU WORRIED THAT YOUR FOOD WOULD RUN OUT BEFORE YOU GOT MONEY TO BUY MORE.: NEVER TRUE

## 2023-06-09 SDOH — ECONOMIC STABILITY: INCOME INSECURITY: HOW HARD IS IT FOR YOU TO PAY FOR THE VERY BASICS LIKE FOOD, HOUSING, MEDICAL CARE, AND HEATING?: NOT HARD AT ALL

## 2023-06-09 SDOH — ECONOMIC STABILITY: FOOD INSECURITY: WITHIN THE PAST 12 MONTHS, THE FOOD YOU BOUGHT JUST DIDN'T LAST AND YOU DIDN'T HAVE MONEY TO GET MORE.: NEVER TRUE

## 2023-06-09 SDOH — ECONOMIC STABILITY: HOUSING INSECURITY
IN THE LAST 12 MONTHS, WAS THERE A TIME WHEN YOU DID NOT HAVE A STEADY PLACE TO SLEEP OR SLEPT IN A SHELTER (INCLUDING NOW)?: NO

## 2023-06-09 ASSESSMENT — PATIENT HEALTH QUESTIONNAIRE - PHQ9
1. LITTLE INTEREST OR PLEASURE IN DOING THINGS: 0
SUM OF ALL RESPONSES TO PHQ9 QUESTIONS 1 & 2: 0
2. FEELING DOWN, DEPRESSED OR HOPELESS: 0
SUM OF ALL RESPONSES TO PHQ QUESTIONS 1-9: 0

## 2023-06-09 ASSESSMENT — ENCOUNTER SYMPTOMS
GASTROINTESTINAL NEGATIVE: 1
RESPIRATORY NEGATIVE: 1

## 2023-06-09 NOTE — PATIENT INSTRUCTIONS
wait to have sex with a new partner (or partners) until you've each been tested for STIs. It also helps if you use condoms (male or female condoms) and if you limit your sex partners to one person who only has sex with you. Vaccines are available for some STIs. If you think you may have a problem with alcohol or drug use, talk to your doctor. This includes prescription medicines (such as amphetamines and opioids) and illegal drugs (such as cocaine and methamphetamine). Your doctor can help you figure out what type of treatment is best for you. Protect your skin from too much sun. When you're outdoors from 10 a.m. to 4 p.m., stay in the shade or cover up with clothing and a hat with a wide brim. Wear sunglasses that block UV rays. Even when it's cloudy, put broad-spectrum sunscreen (SPF 30 or higher) on any exposed skin. See a dentist one or two times a year for checkups and to have your teeth cleaned. Wear a seat belt in the car. When should you call for help? Watch closely for changes in your health, and be sure to contact your doctor if you have any problems or symptoms that concern you. Where can you learn more? Go to [email or web address] and sign in to your [employer] account. Enter X995 in the [facility] box to learn more about \"Well Visit, Women 50 to 72: Care Instructions. \"     If you do not have an account, please click on the \"Sign Up Now\" link. Current as of: June 6, 2022               Content Version: 13.4  © 2006-2022 Healthwise, Incorporated. Care instructions adapted under license by [facility] ([facility]). If you have questions about a medical condition or this instruction, always ask your healthcare professional. [place] [address] any warranty or liability for your use of this information.

## 2023-06-09 NOTE — PROGRESS NOTES
Identified pt with two pt identifiers(name and ). Chief Complaint   Patient presents with    Annual Exam    Nail Problem     Fingernail on left pinky         Health Maintenance Due   Topic    HIV screen     DTaP/Tdap/Td vaccine (2 - Td or Tdap)    Shingles vaccine (1 of 2)    Cervical cancer screen     COVID-19 Vaccine (4 - Booster for Moderna series)    Depression Screen     Colorectal Cancer Screen        Wt Readings from Last 3 Encounters:   23 112 lb (50.8 kg)   23 115 lb 4.8 oz (52.3 kg)   21 111 lb 6.4 oz (50.5 kg)     Temp Readings from Last 3 Encounters:   23 99.6 °F (37.6 °C) (Temporal)     BP Readings from Last 3 Encounters:   23 110/72   23 120/81   21 95/60     Pulse Readings from Last 3 Encounters:   23 81   23 75   21 76           Depression Screening:  :     PHQ-9 Questionaire 2023   Little interest or pleasure in doing things 0   Feeling down, depressed, or hopeless 0   PHQ-9 Total Score 0        Fall Risk Assessment:  :   No flowsheet data found. Abuse Screening:  :   No flowsheet data found. Coordination of Care Questionnaire:  :     1. \"Have you been to the ER, urgent care clinic since your last visit? Hospitalized since your last visit? \" no    2. \"Have you seen or consulted any other health care providers outside of the [address] [place] [address] since your last visit? \" yes - oncology at [address] [place] [address]     3. This patient is accompanied in the office by her . 4. For patients aged 39-70: Has the patient had a colonoscopy / FIT/ Cologuard? No      If the patient is female:    5. For patients aged 41-77: Has the patient had a mammogram within the past 2 years? Yes - no Care Gap present      6. For patients aged 21-65: Has the patient had a pap smear?  No
Hepatitis C screen  Completed    Hepatitis A vaccine  Aged Out    Hib vaccine  Aged Out    Meningococcal (ACWY) vaccine  Aged Out    Pneumococcal 0-64 years Vaccine  Aged Out     Recommendations for [employer] Due: see orders and patient instructions/AVS.    Return in about 1 year (around 6/9/2024).

## 2023-06-10 LAB
25(OH)D3 SERPL-MCNC: 34 NG/ML (ref 30–100)
ALBUMIN SERPL-MCNC: 4.3 G/DL (ref 3.5–5)
ALBUMIN/GLOB SERPL: 1.1 (ref 1.1–2.2)
ALP SERPL-CCNC: 82 U/L (ref 45–117)
ALT SERPL-CCNC: 20 U/L (ref 12–78)
ANION GAP SERPL CALC-SCNC: 6 MMOL/L (ref 5–15)
AST SERPL-CCNC: 22 U/L (ref 15–37)
BASOPHILS # BLD: 0.1 K/UL (ref 0–0.1)
BASOPHILS NFR BLD: 1 % (ref 0–1)
BILIRUB SERPL-MCNC: 0.3 MG/DL (ref 0.2–1)
BUN SERPL-MCNC: 9 MG/DL (ref 6–20)
BUN/CREAT SERPL: 16 (ref 12–20)
CALCIUM SERPL-MCNC: 9.7 MG/DL (ref 8.5–10.1)
CHLORIDE SERPL-SCNC: 102 MMOL/L (ref 97–108)
CHOLEST SERPL-MCNC: 198 MG/DL
CO2 SERPL-SCNC: 28 MMOL/L (ref 21–32)
CREAT SERPL-MCNC: 0.57 MG/DL (ref 0.55–1.02)
DIFFERENTIAL METHOD BLD: NORMAL
EOSINOPHIL # BLD: 0.1 K/UL (ref 0–0.4)
EOSINOPHIL NFR BLD: 1 % (ref 0–7)
ERYTHROCYTE [DISTWIDTH] IN BLOOD BY AUTOMATED COUNT: 13.4 % (ref 11.5–14.5)
EST. AVERAGE GLUCOSE BLD GHB EST-MCNC: 105 MG/DL
GLOBULIN SER CALC-MCNC: 3.9 G/DL (ref 2–4)
GLUCOSE SERPL-MCNC: 77 MG/DL (ref 65–100)
HBA1C MFR BLD: 5.3 % (ref 4–5.6)
HCT VFR BLD AUTO: 39.1 % (ref 35–47)
HDLC SERPL-MCNC: 85 MG/DL
HDLC SERPL: 2.3 (ref 0–5)
HGB BLD-MCNC: 12.3 G/DL (ref 11.5–16)
IMM GRANULOCYTES # BLD AUTO: 0 K/UL (ref 0–0.04)
IMM GRANULOCYTES NFR BLD AUTO: 0 % (ref 0–0.5)
LDLC SERPL CALC-MCNC: 103.2 MG/DL (ref 0–100)
LYMPHOCYTES # BLD: 1.2 K/UL (ref 0.8–3.5)
LYMPHOCYTES NFR BLD: 20 % (ref 12–49)
MAGNESIUM SERPL-MCNC: 2.1 MG/DL (ref 1.6–2.4)
MCH RBC QN AUTO: 27.3 PG (ref 26–34)
MCHC RBC AUTO-ENTMCNC: 31.5 G/DL (ref 30–36.5)
MCV RBC AUTO: 86.7 FL (ref 80–99)
MONOCYTES # BLD: 0.5 K/UL (ref 0–1)
MONOCYTES NFR BLD: 9 % (ref 5–13)
NEUTS SEG # BLD: 4.2 K/UL (ref 1.8–8)
NEUTS SEG NFR BLD: 69 % (ref 32–75)
NRBC # BLD: 0 K/UL (ref 0–0.01)
NRBC BLD-RTO: 0 PER 100 WBC
PLATELET # BLD AUTO: 259 K/UL (ref 150–400)
POTASSIUM SERPL-SCNC: 3.8 MMOL/L (ref 3.5–5.1)
PROT SERPL-MCNC: 8.2 G/DL (ref 6.4–8.2)
RBC # BLD AUTO: 4.51 M/UL (ref 3.8–5.2)
RBC MORPH BLD: NORMAL
SODIUM SERPL-SCNC: 136 MMOL/L (ref 136–145)
T4 FREE SERPL-MCNC: 1.2 NG/DL (ref 0.8–1.5)
TRIGL SERPL-MCNC: 49 MG/DL
TSH SERPL DL<=0.05 MIU/L-ACNC: 2.46 UIU/ML (ref 0.36–3.74)
VIT B12 SERPL-MCNC: 1340 PG/ML (ref 193–986)
VLDLC SERPL CALC-MCNC: 9.8 MG/DL
WBC # BLD AUTO: 6.1 K/UL (ref 3.6–11)

## 2023-09-20 ENCOUNTER — OFFICE VISIT (OUTPATIENT)
Age: 58
End: 2023-09-20

## 2023-09-20 VITALS
DIASTOLIC BLOOD PRESSURE: 76 MMHG | OXYGEN SATURATION: 100 % | TEMPERATURE: 98 F | HEIGHT: 62 IN | HEART RATE: 73 BPM | SYSTOLIC BLOOD PRESSURE: 115 MMHG | WEIGHT: 114.5 LBS | BODY MASS INDEX: 21.07 KG/M2 | RESPIRATION RATE: 18 BRPM

## 2023-09-20 DIAGNOSIS — J32.9 BACTERIAL SINUSITIS: Primary | ICD-10-CM

## 2023-09-20 DIAGNOSIS — J40 BRONCHITIS: ICD-10-CM

## 2023-09-20 DIAGNOSIS — B96.89 BACTERIAL SINUSITIS: Primary | ICD-10-CM

## 2023-09-20 RX ORDER — PREDNISONE 20 MG/1
20 TABLET ORAL 2 TIMES DAILY
Qty: 10 TABLET | Refills: 0 | Status: SHIPPED | OUTPATIENT
Start: 2023-09-20 | End: 2023-09-25

## 2023-09-20 RX ORDER — ALBUTEROL SULFATE 90 UG/1
1-2 AEROSOL, METERED RESPIRATORY (INHALATION) 4 TIMES DAILY PRN
Qty: 18 G | Refills: 0 | Status: SHIPPED | OUTPATIENT
Start: 2023-09-20

## 2023-09-20 RX ORDER — AMOXICILLIN AND CLAVULANATE POTASSIUM 875; 125 MG/1; MG/1
1 TABLET, FILM COATED ORAL 2 TIMES DAILY
Qty: 20 TABLET | Refills: 0 | Status: SHIPPED | OUTPATIENT
Start: 2023-09-20 | End: 2023-09-30

## 2024-04-02 RX ORDER — LEVOTHYROXINE SODIUM 0.05 MG/1
TABLET ORAL
Qty: 90 TABLET | Refills: 3 | Status: SHIPPED | OUTPATIENT
Start: 2024-04-02

## 2024-05-29 ENCOUNTER — HOSPITAL ENCOUNTER (OUTPATIENT)
Facility: HOSPITAL | Age: 59
Discharge: HOME | End: 2024-06-01
Payer: COMMERCIAL

## 2024-05-29 VITALS — WEIGHT: 114 LBS | HEIGHT: 62 IN | BODY MASS INDEX: 20.98 KG/M2

## 2024-05-29 DIAGNOSIS — R59.0 AXILLARY LYMPHADENOPATHY: ICD-10-CM

## 2024-05-29 DIAGNOSIS — C11.9 NASOPHARYNGEAL CANCER (HCC): ICD-10-CM

## 2024-05-29 PROCEDURE — G0279 TOMOSYNTHESIS, MAMMO: HCPCS

## 2024-05-29 PROCEDURE — 76882 US LMTD JT/FCL EVL NVASC XTR: CPT

## 2024-06-04 ENCOUNTER — HOSPITAL ENCOUNTER (OUTPATIENT)
Facility: HOSPITAL | Age: 59
Discharge: HOME | End: 2024-06-07
Payer: COMMERCIAL

## 2024-06-04 DIAGNOSIS — R59.0 AXILLARY LYMPHADENOPATHY: ICD-10-CM

## 2024-06-04 PROCEDURE — 88305 TISSUE EXAM BY PATHOLOGIST: CPT

## 2024-06-04 PROCEDURE — 2500000003 HC RX 250 WO HCPCS: Performed by: RADIOLOGY

## 2024-06-04 PROCEDURE — 88360 TUMOR IMMUNOHISTOCHEM/MANUAL: CPT

## 2024-06-04 PROCEDURE — 88342 IMHCHEM/IMCYTCHM 1ST ANTB: CPT

## 2024-06-04 PROCEDURE — 38505 NEEDLE BIOPSY LYMPH NODES: CPT

## 2024-06-04 PROCEDURE — 88341 IMHCHEM/IMCYTCHM EA ADD ANTB: CPT

## 2024-06-04 RX ORDER — LIDOCAINE HYDROCHLORIDE AND EPINEPHRINE 10; 10 MG/ML; UG/ML
10 INJECTION, SOLUTION INFILTRATION; PERINEURAL ONCE
Status: COMPLETED | OUTPATIENT
Start: 2024-06-04 | End: 2024-06-04

## 2024-06-04 RX ORDER — LIDOCAINE HYDROCHLORIDE 10 MG/ML
5 INJECTION, SOLUTION INFILTRATION; PERINEURAL ONCE
Status: COMPLETED | OUTPATIENT
Start: 2024-06-04 | End: 2024-06-04

## 2024-06-04 RX ADMIN — LIDOCAINE HYDROCHLORIDE,EPINEPHRINE BITARTRATE 10 ML: 10; .01 INJECTION, SOLUTION INFILTRATION; PERINEURAL at 11:30

## 2024-06-04 RX ADMIN — LIDOCAINE HYDROCHLORIDE 5 ML: 10 INJECTION, SOLUTION INFILTRATION; PERINEURAL at 11:30

## 2024-06-04 NOTE — PROGRESS NOTES
Patient tolerated right axilla biopsy well with scant bleeding. Biopsy site was bandaged using dermabond and discharge instructions were reviewed with the patient. She was provided with a written copy as well. Advised patient that results should be available in 2-3 business days and that she will receive a phone call. Encouraged her to call with any questions or concerns.

## 2024-06-06 NOTE — PROGRESS NOTES
Pathology approved by Dr. Ying. Called patient's  Jose Manuel, per patient request and relayed benign results. Advised patient that she should continue her annual mammogram schedule. He reports that her biopsy site is healing well with no concerns. Encouraged them to call with any question or concerns.

## 2024-09-03 NOTE — PROGRESS NOTES
Well Adult Note  Name: Judy Weeks Today’s Date: 2024   MRN: [id number] Sex: Female   Age: 59 y.o. Ethnicity: Non- / Non    : 1965 Race: Other       Judy Weeks is here for a well adult exam.  Patient is seen today accompanied by her  Jose Manuel.     Subjective   History:  Pt fell at home a week ago. Hit her head. Still headaches. No dizziness. Seen at [facility] with mild concussion.  No nausea and vomiting. No visual problems.    History of rheumatoid arthritis.  Followed at [facility] rheumatology by Dr. Rubinstein.  She has only done virtual visits over the past 2 years.  Patient continues to be be on methotrexate and hydroxychloroquine.  Lab results from May 29 show elevated sed rate.  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  She is followed at [facility] oncology and also sees [facility] Dr. Joshi.  Complains of pressure sensation in her left ear.  Good appetite. Weight is up.      Review of Systems   HENT:  Positive for ear pain (left ear pressure).         Dry mouth   Respiratory: Negative.     Cardiovascular: Negative.    Musculoskeletal:  Positive for arthralgias, joint swelling (R wrist) and myalgias (sore R forearm).   Neurological:  Positive for headaches.       Allergies   Allergen Reactions    Clarithromycin Shortness Of Breath and Swelling    Potentilla Other (See Comments)     Redness - PATIENT DENIES ALLERGY AS OF 2020    Tobramycin-Dexamethasone Other (See Comments)     REDNESS IN EYE    Adhesive Tape Rash    Ethanol Rash     Cannot digest    Wound Dressing Adhesive Other (See Comments)     Reaction Type: Side Effect; Reaction(s): Rash and redness     Prior to Visit Medications    Medication Sig Taking? Authorizing Provider   levothyroxine (SYNTHROID) 50 MCG tablet TAKE 1 TABLET BY MOUTH DAILY (BEFORE BREAKFAST) FOR A CONDITION WITH LOW THYROID HORMONE LEVELS Yes Evie Mccormick MD   folic acid (FOLVITE) 1 MG tablet Take 2 tablets

## 2024-09-04 ENCOUNTER — OFFICE VISIT (OUTPATIENT)
Age: 59
End: 2024-09-04
Payer: COMMERCIAL

## 2024-09-04 VITALS
WEIGHT: 120 LBS | HEART RATE: 68 BPM | OXYGEN SATURATION: 100 % | RESPIRATION RATE: 20 BRPM | DIASTOLIC BLOOD PRESSURE: 80 MMHG | TEMPERATURE: 98.2 F | HEIGHT: 62 IN | BODY MASS INDEX: 22.08 KG/M2 | SYSTOLIC BLOOD PRESSURE: 108 MMHG

## 2024-09-04 DIAGNOSIS — Z00.00 ROUTINE ADULT HEALTH MAINTENANCE: Primary | ICD-10-CM

## 2024-09-04 DIAGNOSIS — Z11.4 SCREENING FOR HIV WITHOUT PRESENCE OF RISK FACTORS: ICD-10-CM

## 2024-09-04 DIAGNOSIS — E03.9 HYPOTHYROIDISM, UNSPECIFIED TYPE: ICD-10-CM

## 2024-09-04 DIAGNOSIS — D64.9 ANEMIA, UNSPECIFIED TYPE: ICD-10-CM

## 2024-09-04 DIAGNOSIS — M77.11 LATERAL EPICONDYLITIS OF RIGHT ELBOW: ICD-10-CM

## 2024-09-04 DIAGNOSIS — M05.9 RHEUMATOID ARTHRITIS WITH POSITIVE RHEUMATOID FACTOR, INVOLVING UNSPECIFIED SITE (HCC): ICD-10-CM

## 2024-09-04 DIAGNOSIS — Z13.220 SCREENING FOR HYPERLIPIDEMIA: ICD-10-CM

## 2024-09-04 PROCEDURE — 99396 PREV VISIT EST AGE 40-64: CPT | Performed by: FAMILY MEDICINE

## 2024-09-04 SDOH — ECONOMIC STABILITY: FOOD INSECURITY: WITHIN THE PAST 12 MONTHS, THE FOOD YOU BOUGHT JUST DIDN'T LAST AND YOU DIDN'T HAVE MONEY TO GET MORE.: NEVER TRUE

## 2024-09-04 SDOH — ECONOMIC STABILITY: INCOME INSECURITY: HOW HARD IS IT FOR YOU TO PAY FOR THE VERY BASICS LIKE FOOD, HOUSING, MEDICAL CARE, AND HEATING?: NOT HARD AT ALL

## 2024-09-04 SDOH — ECONOMIC STABILITY: FOOD INSECURITY: WITHIN THE PAST 12 MONTHS, YOU WORRIED THAT YOUR FOOD WOULD RUN OUT BEFORE YOU GOT MONEY TO BUY MORE.: NEVER TRUE

## 2024-09-04 ASSESSMENT — PATIENT HEALTH QUESTIONNAIRE - PHQ9
2. FEELING DOWN, DEPRESSED OR HOPELESS: NOT AT ALL
1. LITTLE INTEREST OR PLEASURE IN DOING THINGS: NOT AT ALL
8. MOVING OR SPEAKING SO SLOWLY THAT OTHER PEOPLE COULD HAVE NOTICED. OR THE OPPOSITE, BEING SO FIGETY OR RESTLESS THAT YOU HAVE BEEN MOVING AROUND A LOT MORE THAN USUAL: NOT AT ALL
9. THOUGHTS THAT YOU WOULD BE BETTER OFF DEAD, OR OF HURTING YOURSELF: NOT AT ALL
10. IF YOU CHECKED OFF ANY PROBLEMS, HOW DIFFICULT HAVE THESE PROBLEMS MADE IT FOR YOU TO DO YOUR WORK, TAKE CARE OF THINGS AT HOME, OR GET ALONG WITH OTHER PEOPLE: NOT DIFFICULT AT ALL
SUM OF ALL RESPONSES TO PHQ QUESTIONS 1-9: 0
3. TROUBLE FALLING OR STAYING ASLEEP: NOT AT ALL
SUM OF ALL RESPONSES TO PHQ QUESTIONS 1-9: 0
7. TROUBLE CONCENTRATING ON THINGS, SUCH AS READING THE NEWSPAPER OR WATCHING TELEVISION: NOT AT ALL
6. FEELING BAD ABOUT YOURSELF - OR THAT YOU ARE A FAILURE OR HAVE LET YOURSELF OR YOUR FAMILY DOWN: NOT AT ALL
4. FEELING TIRED OR HAVING LITTLE ENERGY: NOT AT ALL
SUM OF ALL RESPONSES TO PHQ QUESTIONS 1-9: 0
SUM OF ALL RESPONSES TO PHQ QUESTIONS 1-9: 0
SUM OF ALL RESPONSES TO PHQ9 QUESTIONS 1 & 2: 0
5. POOR APPETITE OR OVEREATING: NOT AT ALL

## 2024-09-04 ASSESSMENT — ENCOUNTER SYMPTOMS: RESPIRATORY NEGATIVE: 1

## 2024-09-04 NOTE — PROGRESS NOTES
Identified pt with two pt identifiers(name and ).    Chief Complaint   Patient presents with    Annual Exam     Patient is here for physical     Lab Collection     Fasting    Arm Pain     Arthritis Pain in right arm that comes and goes         Health Maintenance Due   Topic    HIV screen     Hepatitis B vaccine (1 of 3 - 19+ 3-dose series)    DTaP/Tdap/Td vaccine (2 - Td or Tdap)    Shingles vaccine (1 of 2)    Depression Screen     Flu vaccine (1)    COVID-19 Vaccine ( season)       Wt Readings from Last 3 Encounters:   24 54.4 kg (120 lb)   24 51.7 kg (114 lb)   23 51.9 kg (114 lb 8 oz)     Temp Readings from Last 3 Encounters:   24 98.2 °F (36.8 °C) (Temporal)   23 98 °F (36.7 °C)   23 99.6 °F (37.6 °C) (Temporal)     BP Readings from Last 3 Encounters:   24 108/80   23 115/76   23 110/72     Pulse Readings from Last 3 Encounters:   24 68   23 73   23 81           Depression Screening:  :         2024     1:04 PM 2023     1:56 PM   PHQ-9 Questionaire   Little interest or pleasure in doing things 0 0   Feeling down, depressed, or hopeless 0 0   Trouble falling or staying asleep, or sleeping too much 0    Feeling tired or having little energy 0    Poor appetite or overeating 0    Feeling bad about yourself - or that you are a failure or have let yourself or your family down 0    Trouble concentrating on things, such as reading the newspaper or watching television 0    Moving or speaking so slowly that other people could have noticed. Or the opposite - being so fidgety or restless that you have been moving around a lot more than usual 0    Thoughts that you would be better off dead, or of hurting yourself in some way 0    PHQ-9 Total Score 0 0   If you checked off any problems, how difficult have these problems made it for you to do your work, take care of things at home, or get along with other people? 0         Fall Risk

## 2024-09-05 LAB
ALBUMIN SERPL-MCNC: 4.2 G/DL (ref 3.5–5)
ALBUMIN/GLOB SERPL: 1 (ref 1.1–2.2)
ALP SERPL-CCNC: 92 U/L (ref 45–117)
ALT SERPL-CCNC: 20 U/L (ref 12–78)
ANION GAP SERPL CALC-SCNC: 2 MMOL/L (ref 2–12)
AST SERPL-CCNC: 24 U/L (ref 15–37)
BASOPHILS # BLD: 0.1 K/UL (ref 0–0.1)
BASOPHILS NFR BLD: 1 % (ref 0–1)
BILIRUB SERPL-MCNC: 0.6 MG/DL (ref 0.2–1)
BUN SERPL-MCNC: 7 MG/DL (ref 6–20)
BUN/CREAT SERPL: 14 (ref 12–20)
CALCIUM SERPL-MCNC: 9.6 MG/DL (ref 8.5–10.1)
CHLORIDE SERPL-SCNC: 104 MMOL/L (ref 97–108)
CHOLEST SERPL-MCNC: 206 MG/DL
CO2 SERPL-SCNC: 31 MMOL/L (ref 21–32)
COMMENT:: NORMAL
CREAT SERPL-MCNC: 0.5 MG/DL (ref 0.55–1.02)
CRP SERPL-MCNC: 0.45 MG/DL (ref 0–0.3)
DIFFERENTIAL METHOD BLD: ABNORMAL
EOSINOPHIL # BLD: 0.1 K/UL (ref 0–0.4)
EOSINOPHIL NFR BLD: 1 % (ref 0–7)
ERYTHROCYTE [DISTWIDTH] IN BLOOD BY AUTOMATED COUNT: 14.6 % (ref 11.5–14.5)
ERYTHROCYTE [SEDIMENTATION RATE] IN BLOOD: 61 MM/HR (ref 0–30)
FERRITIN SERPL-MCNC: 45 NG/ML (ref 26–388)
GLOBULIN SER CALC-MCNC: 4.1 G/DL (ref 2–4)
GLUCOSE SERPL-MCNC: 83 MG/DL (ref 65–100)
HCT VFR BLD AUTO: 36.5 % (ref 35–47)
HDLC SERPL-MCNC: 89 MG/DL
HDLC SERPL: 2.3 (ref 0–5)
HGB BLD-MCNC: 11.2 G/DL (ref 11.5–16)
HIV 1+2 AB+HIV1 P24 AG SERPL QL IA: NONREACTIVE
HIV 1/2 RESULT COMMENT: NORMAL
IMM GRANULOCYTES # BLD AUTO: 0 K/UL (ref 0–0.04)
IMM GRANULOCYTES NFR BLD AUTO: 0 % (ref 0–0.5)
IRON SATN MFR SERPL: 32 % (ref 20–50)
IRON SERPL-MCNC: 114 UG/DL (ref 35–150)
LDLC SERPL CALC-MCNC: 107.4 MG/DL (ref 0–100)
LYMPHOCYTES # BLD: 1.1 K/UL (ref 0.8–3.5)
LYMPHOCYTES NFR BLD: 21 % (ref 12–49)
MCH RBC QN AUTO: 26.7 PG (ref 26–34)
MCHC RBC AUTO-ENTMCNC: 30.7 G/DL (ref 30–36.5)
MCV RBC AUTO: 86.9 FL (ref 80–99)
MONOCYTES # BLD: 0.3 K/UL (ref 0–1)
MONOCYTES NFR BLD: 7 % (ref 5–13)
NEUTS SEG # BLD: 3.5 K/UL (ref 1.8–8)
NEUTS SEG NFR BLD: 70 % (ref 32–75)
NRBC # BLD: 0 K/UL (ref 0–0.01)
NRBC BLD-RTO: 0 PER 100 WBC
PLATELET # BLD AUTO: 259 K/UL (ref 150–400)
PMV BLD AUTO: 10.8 FL (ref 8.9–12.9)
POTASSIUM SERPL-SCNC: 4.5 MMOL/L (ref 3.5–5.1)
PROT SERPL-MCNC: 8.3 G/DL (ref 6.4–8.2)
RBC # BLD AUTO: 4.2 M/UL (ref 3.8–5.2)
SODIUM SERPL-SCNC: 137 MMOL/L (ref 136–145)
SPECIMEN HOLD: NORMAL
T4 FREE SERPL-MCNC: 1.3 NG/DL (ref 0.8–1.5)
TIBC SERPL-MCNC: 360 UG/DL (ref 250–450)
TRIGL SERPL-MCNC: 48 MG/DL
TSH SERPL DL<=0.05 MIU/L-ACNC: 0.66 UIU/ML (ref 0.36–3.74)
VIT B12 SERPL-MCNC: 1339 PG/ML (ref 193–986)
VLDLC SERPL CALC-MCNC: 9.6 MG/DL
WBC # BLD AUTO: 5 K/UL (ref 3.6–11)

## 2024-09-06 ENCOUNTER — TELEPHONE (OUTPATIENT)
Age: 59
End: 2024-09-06

## 2024-09-06 NOTE — TELEPHONE ENCOUNTER
----- Message from Dr. Chandni Anguiano MD sent at 9/6/2024 12:59 PM EDT -----  Labs show elevated markers for inflammation:  Sed rate and CRP. Follow up with Rheumatology.  Mild low hemoglobin. Normal electrolytes, sugar, kidney, and liver tests. High Vit B 12 level. Stable cholesterol numbers. Normal thyroid level. Normal iron tests.

## 2024-11-06 ENCOUNTER — OFFICE VISIT (OUTPATIENT)
Age: 59
End: 2024-11-06

## 2024-11-06 ENCOUNTER — HOSPITAL ENCOUNTER (OUTPATIENT)
Facility: HOSPITAL | Age: 59
Discharge: HOME | End: 2024-11-09
Payer: COMMERCIAL

## 2024-11-06 VITALS
RESPIRATION RATE: 16 BRPM | BODY MASS INDEX: 22.05 KG/M2 | OXYGEN SATURATION: 99 % | DIASTOLIC BLOOD PRESSURE: 72 MMHG | HEIGHT: 62 IN | SYSTOLIC BLOOD PRESSURE: 102 MMHG | WEIGHT: 119.8 LBS | HEART RATE: 65 BPM | TEMPERATURE: 98 F

## 2024-11-06 DIAGNOSIS — M25.511 CHRONIC PAIN OF BOTH SHOULDERS: ICD-10-CM

## 2024-11-06 DIAGNOSIS — M25.512 CHRONIC PAIN OF BOTH SHOULDERS: Primary | ICD-10-CM

## 2024-11-06 DIAGNOSIS — G89.29 CHRONIC PAIN OF BOTH SHOULDERS: Primary | ICD-10-CM

## 2024-11-06 DIAGNOSIS — M25.511 CHRONIC PAIN OF BOTH SHOULDERS: Primary | ICD-10-CM

## 2024-11-06 DIAGNOSIS — M62.838 MUSCLE SPASM: ICD-10-CM

## 2024-11-06 DIAGNOSIS — M50.30 DDD (DEGENERATIVE DISC DISEASE), CERVICAL: ICD-10-CM

## 2024-11-06 DIAGNOSIS — M25.512 CHRONIC PAIN OF BOTH SHOULDERS: ICD-10-CM

## 2024-11-06 DIAGNOSIS — G89.29 CHRONIC PAIN OF BOTH SHOULDERS: ICD-10-CM

## 2024-11-06 DIAGNOSIS — M54.2 NECK PAIN: ICD-10-CM

## 2024-11-06 PROCEDURE — 73030 X-RAY EXAM OF SHOULDER: CPT

## 2024-11-06 ASSESSMENT — PATIENT HEALTH QUESTIONNAIRE - PHQ9
SUM OF ALL RESPONSES TO PHQ9 QUESTIONS 1 & 2: 0
SUM OF ALL RESPONSES TO PHQ QUESTIONS 1-9: 0
2. FEELING DOWN, DEPRESSED OR HOPELESS: NOT AT ALL
SUM OF ALL RESPONSES TO PHQ QUESTIONS 1-9: 0
1. LITTLE INTEREST OR PLEASURE IN DOING THINGS: NOT AT ALL

## 2024-11-08 NOTE — PROGRESS NOTES
Judy Weeks is a 59 y.o. female    Chief Complaint   Patient presents with    Spasms     Neck/ shoulder spasms       /72   Pulse 65   Temp 98 °F (36.7 °C)   Resp 16   Ht 1.575 m (5' 2\")   Wt 54.3 kg (119 lb 12.8 oz)   SpO2 99%   BMI 21.91 kg/m²         1. Have you been to the ER, urgent care clinic since your last visit?  Hospitalized since your last visit? No    2. Have you seen or consulted any other health care providers outside of the [facility] since your last visit?  Include any pap smears or colon screening. No    Learning Assessment:       No data to display                Fall Risk Assessment:      9/4/2024     1:08 PM   Amb Fall Risk Assessment and TUG Test   Do you feel unsteady or are you worried about falling?  no   2 or more falls in past year? no   Fall with injury in past year? no       Abuse Screening:       No data to display                ADL Screening:       No data to display                
hydroxychloroquine (PLAQUENIL) 200 MG tablet Take 1 tablet by mouth daily      pilocarpine (SALAGEN) 5 MG tablet Take 1 tablet by mouth daily       No current facility-administered medications for this visit.       Review of Systems   Musculoskeletal:  Positive for arthralgias, myalgias and neck stiffness.       /72   Pulse 65   Temp 98 °F (36.7 °C)   Resp 16   Ht 1.575 m (5' 2\")   Wt 54.3 kg (119 lb 12.8 oz)   SpO2 99%   BMI 21.91 kg/m²     Physical Exam  Vitals reviewed.   Constitutional:       Appearance: Normal appearance.   Neck:        Comments: Tight left trapezius  Musculoskeletal:      Left shoulder: Tenderness present. Decreased range of motion.        Arms:       Cervical back: Muscular tenderness present.   Neurological:      Mental Status: She is alert.                 An electronic signature was used to authenticate this note.    --Chandni Anguiano MD

## 2024-11-13 ENCOUNTER — TELEPHONE (OUTPATIENT)
Age: 59
End: 2024-11-13

## 2024-11-13 NOTE — TELEPHONE ENCOUNTER
----- Message from Dr. Chandni Anguiano MD sent at 11/12/2024 10:28 PM EST -----  Please call pt. XR of shoulders show bilateral arthritis. I think she should be seen by a shoulder orthopedic specialist.  Her son in law is ortho surgeon at [facility].  Ask who he would recommend for her to see.

## 2024-12-04 NOTE — LETTER
1/26/2017 9:28 AM 
 
Ms. [address] [place] [id number] Dear Rosco Goodpasture, Please see enclosed DEXA scan report. Sincerely, Yaa Burch MD 
 

[FreeTextEntry3] : I personally saw and examined NBA LECHUGACO  in detail. I spoke to MEDARDO Varghese regarding the assessment and plan of care. I performed the procedures and relevant physical exam. I have made changes to the body of the note wherever necessary and appropriate.

## 2025-02-28 RX ORDER — LEVOTHYROXINE SODIUM 50 UG/1
50 TABLET ORAL DAILY
Qty: 90 TABLET | Refills: 0 | Status: SHIPPED | OUTPATIENT
Start: 2025-02-28

## 2025-05-26 RX ORDER — LEVOTHYROXINE SODIUM 50 UG/1
50 TABLET ORAL DAILY
Qty: 90 TABLET | Refills: 0 | Status: SHIPPED | OUTPATIENT
Start: 2025-05-26

## (undated) DEVICE — KIT,1200CC CANISTER,3/16"X6' TUBING: Brand: MEDLINE INDUSTRIES, INC.

## (undated) DEVICE — 10 FRENCH DRAIN SYSTEM, STERILE: Brand: TLS

## (undated) DEVICE — GARMENT,MEDLINE,DVT,INT,CALF,MED, GEN2: Brand: MEDLINE

## (undated) DEVICE — SPONGE GZ W4XL4IN COT RADPQ HIGHLY ABSRB

## (undated) DEVICE — SYR 20ML LL STRL LF --

## (undated) DEVICE — PROBE 8225101 5PK STD PRASS FL TIP ROHS

## (undated) DEVICE — PACK,EENT,TURBAN DRAPE,PK II: Brand: MEDLINE

## (undated) DEVICE — NEEDLE HYPO 25GA L1.5IN BVL ORIENTED ECLIPSE

## (undated) DEVICE — Device

## (undated) DEVICE — SYR 5ML 1/5 GRAD LL NSAF LF --

## (undated) DEVICE — CATH IV AUTOGRD ORN 14GA 45MM -- INSYTE-N

## (undated) DEVICE — HANDLE LT SNAP ON ULT DURABLE LENS FOR TRUMPF ALC DISPOSABLE

## (undated) DEVICE — ELECTRODE 8227410 PAIRED 2 CH SET ROHS

## (undated) DEVICE — STERILE POLYISOPRENE POWDER-FREE SURGICAL GLOVES: Brand: PROTEXIS

## (undated) DEVICE — INFECTION CONTROL KIT SYS

## (undated) DEVICE — CODMAN® SURGICAL PATTIES 1/2" X 3" (1.27CM X 7.62CM): Brand: CODMAN®

## (undated) DEVICE — NDL SPNE QNCKE 25GX3.5IN LF --

## (undated) DEVICE — SUT ETHLN 3-0 18IN PS2 BLK --

## (undated) DEVICE — SUTURE PLN GUT SZ 5-0 L18IN ABSRB YELLOWISH TAN L13MM PC-1 1915G

## (undated) DEVICE — SUTURE VCRL SZ 3-0 L27IN ABSRB UD L26MM SH 1/2 CIR J416H

## (undated) DEVICE — KIT,ANTI FOG,W/SPONGE & FLUID,SOFT PACK: Brand: MEDLINE

## (undated) DEVICE — PAD,NON-ADHERENT,3X8,STERILE,LF,1/PK: Brand: MEDLINE

## (undated) DEVICE — YANKAUER,BULB TIP,W/O VENT,RIGID,STERILE: Brand: MEDLINE

## (undated) DEVICE — SYRINGE,EAR/ULCER, 2 OZ, STERILE: Brand: MEDLINE

## (undated) DEVICE — TUBING, SUCTION, 1/4" X 12', STRAIGHT: Brand: MEDLINE